# Patient Record
Sex: FEMALE | Race: WHITE | NOT HISPANIC OR LATINO | Employment: FULL TIME | URBAN - METROPOLITAN AREA
[De-identification: names, ages, dates, MRNs, and addresses within clinical notes are randomized per-mention and may not be internally consistent; named-entity substitution may affect disease eponyms.]

---

## 2022-02-25 ENCOUNTER — TRANSFERRED RECORDS (OUTPATIENT)
Dept: HEALTH INFORMATION MANAGEMENT | Facility: CLINIC | Age: 57
End: 2022-02-25

## 2022-02-25 LAB
ALT SERPL-CCNC: 119.1 U/L (ref 6–55)
AST SERPL-CCNC: 160.8 U/L (ref 8–34)
CREATININE (EXTERNAL): 0.6 MG/DL (ref 0.6–1.2)
GFR ESTIMATED (EXTERNAL): 103 ML/MIN/1.73M2
GFR ESTIMATED (IF AFRICAN AMERICAN) (EXTERNAL): 124 ML/MIN/1.73M2
GLUCOSE (EXTERNAL): 88.8 MG/DL (ref 70–105)
HEP C HIM: NORMAL
POTASSIUM (EXTERNAL): 3.8 MEQ/L (ref 3.5–5)

## 2022-03-02 ENCOUNTER — TRANSFERRED RECORDS (OUTPATIENT)
Dept: HEALTH INFORMATION MANAGEMENT | Facility: CLINIC | Age: 57
End: 2022-03-02

## 2022-03-09 ENCOUNTER — TRANSFERRED RECORDS (OUTPATIENT)
Dept: HEALTH INFORMATION MANAGEMENT | Facility: CLINIC | Age: 57
End: 2022-03-09

## 2022-03-10 ENCOUNTER — TRANSFERRED RECORDS (OUTPATIENT)
Dept: HEALTH INFORMATION MANAGEMENT | Facility: CLINIC | Age: 57
End: 2022-03-10

## 2022-03-23 ENCOUNTER — TRANSFERRED RECORDS (OUTPATIENT)
Dept: HEALTH INFORMATION MANAGEMENT | Facility: CLINIC | Age: 57
End: 2022-03-23

## 2022-04-05 ENCOUNTER — TRANSFERRED RECORDS (OUTPATIENT)
Dept: HEALTH INFORMATION MANAGEMENT | Facility: CLINIC | Age: 57
End: 2022-04-05

## 2022-04-12 ENCOUNTER — TRANSFERRED RECORDS (OUTPATIENT)
Dept: HEALTH INFORMATION MANAGEMENT | Facility: CLINIC | Age: 57
End: 2022-04-12

## 2022-04-15 LAB
ALT SERPL-CCNC: 94.9 U/L (ref 6–55)
AST SERPL-CCNC: 164.3 U/L (ref 8–34)
CHOLESTEROL (EXTERNAL): 285.8 MG/DL (ref 80–200)
CREATININE (EXTERNAL): 0.7 MG/DL (ref 0.6–1.2)
GFR ESTIMATED (EXTERNAL): 86 ML/MIN/1.73M2
GFR ESTIMATED (IF AFRICAN AMERICAN) (EXTERNAL): 104 ML/MIN/1.73M2
GLUCOSE (EXTERNAL): 128.4 MG/DL (ref 70–105)
HDLC SERPL-MCNC: 47.8 MG/DL (ref 30–70)
INR (EXTERNAL): 0.97 (ref 0.8–3.5)
LDL CHOLESTEROL DIRECT (EXTERNAL): 230.1 MG/DL (ref 10–130)
POTASSIUM (EXTERNAL): 3.9 MEQ/L (ref 3.5–5)
TRIGLYCERIDES (EXTERNAL): 170.3 MG/DL (ref 80–200)

## 2022-11-03 ENCOUNTER — TRANSFERRED RECORDS (OUTPATIENT)
Dept: HEALTH INFORMATION MANAGEMENT | Facility: CLINIC | Age: 57
End: 2022-11-03

## 2022-11-21 ENCOUNTER — TRANSFERRED RECORDS (OUTPATIENT)
Dept: HEALTH INFORMATION MANAGEMENT | Facility: CLINIC | Age: 57
End: 2022-11-21

## 2022-11-30 ENCOUNTER — TRANSFERRED RECORDS (OUTPATIENT)
Dept: HEALTH INFORMATION MANAGEMENT | Facility: CLINIC | Age: 57
End: 2022-11-30

## 2022-12-07 ENCOUNTER — TRANSFERRED RECORDS (OUTPATIENT)
Dept: HEALTH INFORMATION MANAGEMENT | Facility: CLINIC | Age: 57
End: 2022-12-07

## 2022-12-09 ENCOUNTER — TRANSFERRED RECORDS (OUTPATIENT)
Dept: HEALTH INFORMATION MANAGEMENT | Facility: CLINIC | Age: 57
End: 2022-12-09

## 2022-12-21 ENCOUNTER — TRANSFERRED RECORDS (OUTPATIENT)
Dept: HEALTH INFORMATION MANAGEMENT | Facility: CLINIC | Age: 57
End: 2022-12-21

## 2022-12-28 ENCOUNTER — TELEPHONE (OUTPATIENT)
Dept: UROLOGY | Facility: CLINIC | Age: 57
End: 2022-12-28

## 2022-12-28 NOTE — TELEPHONE ENCOUNTER
[facility] Call Center    Phone Message    May a detailed message be left on voicemail: yes     Reason for Call: Writer would like assistance for this patient. She lives in [place] in the winter and is seeing a urologist there. They cannot figure out what is wrong with her. Her bladder does not work at all. All functions have stopped. The only reason she can urinate is due to gravity. She also experiences a lot of bladder pain. Her providers say that she should not be feeling any bladder pain because there is no function, they are assuming its from her L5 back issue. Writer would like assistance in scheduling with the best provider as she will be traveling back for visit. Please reach out to patient with any questions. Thank you    Action Taken: Message routed to:  [facility] Uro    Travel Screening: Not Applicable

## 2023-01-06 ENCOUNTER — MEDICAL CORRESPONDENCE (OUTPATIENT)
Dept: HEALTH INFORMATION MANAGEMENT | Facility: CLINIC | Age: 58
End: 2023-01-06

## 2023-01-06 NOTE — TELEPHONE ENCOUNTER
Spoke with patient and answered many questions about Dr Zelaya and Dr Phillips. She needs to check with her insurance company if she is able to come and see either provider. Clinic number was given and my number for further questions.

## 2023-01-09 ENCOUNTER — TRANSFERRED RECORDS (OUTPATIENT)
Dept: HEALTH INFORMATION MANAGEMENT | Facility: CLINIC | Age: 58
End: 2023-01-09

## 2023-01-10 ENCOUNTER — TRANSCRIBE ORDERS (OUTPATIENT)
Dept: OTHER | Age: 58
End: 2023-01-10

## 2023-01-10 DIAGNOSIS — N31.9 NEUROMUSCULAR DYSFUNCTION OF BLADDER, UNSPECIFIED: Primary | ICD-10-CM

## 2023-01-12 ENCOUNTER — TRANSFERRED RECORDS (OUTPATIENT)
Dept: HEALTH INFORMATION MANAGEMENT | Facility: CLINIC | Age: 58
End: 2023-01-12

## 2023-01-12 ENCOUNTER — MEDICAL CORRESPONDENCE (OUTPATIENT)
Dept: HEALTH INFORMATION MANAGEMENT | Facility: CLINIC | Age: 58
End: 2023-01-12

## 2023-01-16 ENCOUNTER — TRANSCRIBE ORDERS (OUTPATIENT)
Dept: OTHER | Age: 58
End: 2023-01-16

## 2023-01-16 DIAGNOSIS — G96.191 PERINEURAL CYST: Primary | ICD-10-CM

## 2023-01-17 NOTE — TELEPHONE ENCOUNTER
Action 2023 JTV 2:14 PM    Action Taken [facility] sent an urgent request to  for images to be pushed.  Fax: [phone]    CSS called [facility] [place] Phone: [phone] and spoke with Gita. Gita confirmed images and provided mailing information.    [facility] [place] Phone: [phone] fax: [phone] [address], [facility] [address]    Urgent requests was sent. Trackin    23 11:44AM received imaging disc from [employer], dropped off with [employer] to upload - Amay      Action 2023 JTv 12:24 PM    Action Taken CSS sent a 2nd urgent request to  for images to be pushed.     Action 2023 JTV 12:16 PM    Action Taken CSS called . [facility] was connected with HIM and spoke with Karla. Karla confirmed records were faxed on 2022 -- CD mail on 2022. Karla will refax records.        MEDICAL RECORDS REQUEST   [facility]  Urology Clinic  [address] [place] [address]  PHONE: [phone]  Fax: [phone]        FUTURE VISIT INFORMATION                                                   Lara DARRELL Lloyd, : 1965 scheduled for future visit at [facility]    APPOINTMENT INFORMATION:    Date: 02/15/2023    Provider: Michael Phillips MD    Reason for Visit/Diagnosis: Neurogenic dysfunction of the urinary bladder, discuss surgery    REFERRAL INFORMATION:    Referring provider:  Raeann Marcial NP @ [facility] contact number:  Phone: [phone], Fax: [phone]    RECORDS REQUESTED FOR VISIT                                                     NOTES  STATUS/DETAILS   OFFICE NOTE from referring provider Media Tab yes, 2023, 2022, 2022, 2022, 03/10/2022, 2022  -- Raeann Marcial NP @ [facility]   OFFICE NOTE from other specialist Media Tab yes, 2023 -- CORTEZ  LEEANN BENNETT MD @ [facility]    11/30/2022, 11/21/2022, 04/20/2022, 04/12/2022 -- ROSLYN RUIZ MD @  [facility] [place], [place]   OPERATIVE REPORT  yes   MEDICATION LIST  yes   LABS     URINALYSIS (UA) Media Tab yes   UROFLOW Media Tab yes   IMAGES  yes, [facility]  03/02/2022 -- US KIDNEY   03/02/2022 -- US ABD    [facility] [place] Phone: [phone]  03/23/2022 -- MR ABD  04/05/2022 -- CT ABD PELVIS     PRE-VISIT CHECKLIST      Record collection complete yes   Appointment appropriately scheduled           (right time/right provider) Yes   Joint diagnostic appointment coordinated correctly          (ensure right order & amount of time) Yes   MyChart activation Yes   Questionnaire complete If no, please explain PENDING

## 2023-01-19 NOTE — TELEPHONE ENCOUNTER
SPINE PATIENTS - NEW PROTOCOL PREVISIT    RECORDS RECEIVED FROM: .   REASON FOR VISIT: Perineural cyst [G96.191]   Date of Appt: 04/18/2023   NOTES (FOR ALL VISITS) STATUS DETAILS   OFFICE NOTE from referring provider Received 01/12/2023 [facility]    OFFICE NOTE from other specialist Received 01/09/2023 [facility]    DISCHARGE SUMMARY from hospital N/A    DISCHARGE REPORT from ER N/A    EMG REPORT N/A    MEDICATION LIST N/A    IMAGING  (FOR ALL VISITS)     MRI (HEAD, NECK, SPINE) Received 12/09/2022 lumbar spine   XRAY (SPINE) *NEUROSURGERY* Received    CT (HEAD, NECK, SPINE) N/A       Images in PACS

## 2023-02-01 ENCOUNTER — PRE VISIT (OUTPATIENT)
Dept: UROLOGY | Facility: CLINIC | Age: 58
End: 2023-02-01

## 2023-02-02 NOTE — TELEPHONE ENCOUNTER
Records received    February 2, 2023 4:12 PM  [address] [facility]   Outcome Received imaging disc, uploaded to PACS - [facility]

## 2023-02-12 ENCOUNTER — HEALTH MAINTENANCE LETTER (OUTPATIENT)
Age: 58
End: 2023-02-12

## 2023-02-15 ENCOUNTER — VIRTUAL VISIT (OUTPATIENT)
Dept: UROLOGY | Facility: CLINIC | Age: 58
End: 2023-02-15
Payer: COMMERCIAL

## 2023-02-15 DIAGNOSIS — G96.191 TARLOV CYST: Primary | ICD-10-CM

## 2023-02-15 DIAGNOSIS — N31.9 NEUROMUSCULAR DYSFUNCTION OF BLADDER, UNSPECIFIED: ICD-10-CM

## 2023-02-15 PROCEDURE — 99203 OFFICE O/P NEW LOW 30 MIN: CPT | Mod: VID | Performed by: UROLOGY

## 2023-02-15 RX ORDER — CEPHALEXIN 250 MG/1
TABLET ORAL
COMMUNITY
Start: 2022-05-04 | End: 2023-04-21

## 2023-02-15 ASSESSMENT — PATIENT HEALTH QUESTIONNAIRE - PHQ9
SUM OF ALL RESPONSES TO PHQ QUESTIONS 1-9: 17
10. IF YOU CHECKED OFF ANY PROBLEMS, HOW DIFFICULT HAVE THESE PROBLEMS MADE IT FOR YOU TO DO YOUR WORK, TAKE CARE OF THINGS AT HOME, OR GET ALONG WITH OTHER PEOPLE: EXTREMELY DIFFICULT
SUM OF ALL RESPONSES TO PHQ QUESTIONS 1-9: 17

## 2023-02-15 NOTE — PROGRESS NOTES
"Video-Visit Details    Type of service:  Video Visit    Video Start Time (time video started): 100p    Video End Time (time video stopped): 130p    Originating Location (pt. Location): Home        Distant Location (provider location):  Off-site    Mode of Communication:  Video Conference via [employer]        New Consult   Name: Lara Lloyd    MRN: [id number]   YOB: 1965  Accompanied at today's visit by:self                 Chief Complaint:   acontractile bladder         History of Present Illness:   HISTORY: Lara Lloyd is a 57 year old female with a history of bladder issues thought to be related to sacral/spinal cord issues.  3 years ago started having recurrent UTIs.  Went to urogynecologist - Dr. Delroy Menjivar in [place].  Had a cystoscopy.  States she has \"pain in her bladder\" but other urologist said that's not possible.  Had UDS which per report notes acontractile bladder.  Notes she does push to urinate.      MRI performed on lumbar spine:    OTHER: Tarlov cysts in the sacral region.       IMPRESSION   1.  Mild-to-moderate canal stenosis at L4-5, eccentric to the left, secondary to disc bulge with broad-based left paracentral protrusion. Narrowing of left lateral recess could affect proximal left L5 nerve root.   2.  Mild canal stenosis at L3-4 due to disc bulge with shallow central disc extrusion extending slightly above the disc level.   3.  Spondylosis at other lumbar levels, as outlined above, without significant canal stenosis.   4.  Mild scoliosis.        She voids on her own.  She has been getting recurrent UTIs.  When she notes these, she gets pain in bladder \"like being electrified in her bladder\".  She was told by urogynecologist that she should have sacroneuromodulation.  No other major medical issues.             Past Medical History:   No past medical history on file.         Past Surgical History:   No past surgical history on file.         Social History:     Social " History     Tobacco Use     Smoking status: Never     Smokeless tobacco: Never   Substance Use Topics     Alcohol use: Not on file            Family History:   No family history on file.           Allergies:     Allergies   Allergen Reactions     Formaldehyde Other (See Comments)     unknown     Sulfa Drugs Hives     Seasonal Allergies Other (See Comments)     Multiple reactions            Medications:     Current Outpatient Medications   Medication Sig     cephalexin 250 MG TABS Take 1 tablet every day by oral route for 90 days.     No current facility-administered medications for this visit.             Review of Systems:    ROS: 14 point ROS neg other than the symptoms noted above in the HPI and PMH.          Physical Exam:   Exam:           Constitutional - No apparent distress. Patient of stated age.               Eyes - no redness or discharge.              Respiratory - no cough. no labored breathing              Musculoskeletal - full range of motion in all extremities              Skin - no visible skin discoloration or lesions              Neurological - no tremors              Psychiatric - no anxiety, alert & oriented                 The rest of a comprehensive physical examination is deferred due to PHE (public health emergency) video visit restrictions.            Data:        Outside records:  I spent 10 minutes reviewing outside records. Findings include: numerous notes from prior Urogyn  acontractile bladder on UDS.          Assessment and Plan:   57 year old female with likely acontractile bladder thought to be related to a Sacral Cyst?    Patient requested neurosurgery referral. I placed order.    Patient was told to get sacroneuromodulator. I'm not sure feasability given sacral cyst. I think she would benefit from consultation with Dr. Padilla who generally does SNS at our hospital.    Michael Phillips MD  February 15, 2023

## 2023-02-15 NOTE — NURSING NOTE
Is the patient currently in the state of [place]? YES    Visit mode:VIDEO    If the visit is dropped, the patient can be reconnected by: VIDEO VISIT: Text to cell phone: [phone]    Will anyone else be joining the visit? NO      How would you like to obtain your AVS? MyChart    Are changes needed to the allergy or medication list? NO    Comments or concerns regarding today's visit: no

## 2023-02-15 NOTE — LETTER
"2/15/2023       RE: Lara Lloyd  [address]  [place] [address]     Dear Colleague,    Thank you for referring your patient, Lara Lloyd, to the [facility] [place] at [facility]. Please see a copy of my visit note below.    Video-Visit Details    Type of service:  Video Visit    Video Start Time (time video started): 100p    Video End Time (time video stopped): 130p    Originating Location (pt. Location): Home        Distant Location (provider location):  Off-site    Mode of Communication:  Video Conference via [employer]   Name: Lara Lloyd    MRN: [id number]   YOB: 1965  Accompanied at today's visit by:self                 Chief Complaint:   acontractile bladder         History of Present Illness:   HISTORY: Lara Lloyd is a 57 year old female with a history of bladder issues thought to be related to sacral/spinal cord issues.  3 years ago started having recurrent UTIs.  Went to urogynecologist - Dr. Delroy Menjivar in [place].  Had a cystoscopy.  States she has \"pain in her bladder\" but other urologist said that's not possible.  Had UDS which per report notes acontractile bladder.  Notes she does push to urinate.      MRI performed on lumbar spine:    OTHER: Tarlov cysts in the sacral region.       IMPRESSION   1.  Mild-to-moderate canal stenosis at L4-5, eccentric to the left, secondary to disc bulge with broad-based left paracentral protrusion. Narrowing of left lateral recess could affect proximal left L5 nerve root.   2.  Mild canal stenosis at L3-4 due to disc bulge with shallow central disc extrusion extending slightly above the disc level.   3.  Spondylosis at other lumbar levels, as outlined above, without significant canal stenosis.   4.  Mild scoliosis.        She voids on her own.  She has been getting recurrent UTIs.  When she notes these, she gets pain in bladder \"like " "being electrified in her bladder\".  She was told by urogynecologist that she should have sacroneuromodulation.  No other major medical issues.             Past Medical History:   No past medical history on file.         Past Surgical History:   No past surgical history on file.         Social History:     Social History     Tobacco Use     Smoking status: Never     Smokeless tobacco: Never   Substance Use Topics     Alcohol use: Not on file            Family History:   No family history on file.           Allergies:     Allergies   Allergen Reactions     Formaldehyde Other (See Comments)     unknown     Sulfa Drugs Hives     Seasonal Allergies Other (See Comments)     Multiple reactions            Medications:     Current Outpatient Medications   Medication Sig     cephalexin 250 MG TABS Take 1 tablet every day by oral route for 90 days.     No current facility-administered medications for this visit.             Review of Systems:    ROS: 14 point ROS neg other than the symptoms noted above in the HPI and PMH.          Physical Exam:   Exam:           Constitutional - No apparent distress. Patient of stated age.               Eyes - no redness or discharge.              Respiratory - no cough. no labored breathing              Musculoskeletal - full range of motion in all extremities              Skin - no visible skin discoloration or lesions              Neurological - no tremors              Psychiatric - no anxiety, alert & oriented                 The rest of a comprehensive physical examination is deferred due to PHE (public health emergency) video visit restrictions.            Data:        Outside records:  I spent 10 minutes reviewing outside records. Findings include: numerous notes from prior Urogyn  acontractile bladder on UDS.          Assessment and Plan:   57 year old female with likely acontractile bladder thought to be related to a Sacral Cyst?    Patient requested neurosurgery referral. I placed " order.    Patient was told to get sacroneuromodulator. I'm not sure feasability given sacral cyst. I think she would benefit from consultation with Dr. Padilla who generally does SNS at our hospital.    Michael Phillips MD  February 15, 2023           Again, thank you for allowing me to participate in the care of your patient.      Sincerely,    Michael Phillips MD

## 2023-02-15 NOTE — LETTER
Date:February 16, 2023      Provider requested that no letter be sent. Do not send.       [facility]

## 2023-02-20 ENCOUNTER — PRE VISIT (OUTPATIENT)
Dept: UROLOGY | Facility: CLINIC | Age: 58
End: 2023-02-20
Payer: COMMERCIAL

## 2023-02-20 ENCOUNTER — TELEPHONE (OUTPATIENT)
Dept: UROLOGY | Facility: CLINIC | Age: 58
End: 2023-02-20
Payer: COMMERCIAL

## 2023-02-20 NOTE — TELEPHONE ENCOUNTER
Reason for Visit: Consult on SNS    Diagnosis: Tarlov cyst, Neuromuscular dysfunction of bladder    Rooming Requirements: Normal      Elycia Omega  02/20/23  3:43 PM

## 2023-02-22 ENCOUNTER — VIRTUAL VISIT (OUTPATIENT)
Dept: UROLOGY | Facility: CLINIC | Age: 58
End: 2023-02-22
Payer: COMMERCIAL

## 2023-02-22 DIAGNOSIS — N39.41 URGE INCONTINENCE: Primary | ICD-10-CM

## 2023-02-22 DIAGNOSIS — R33.9 URINARY RETENTION: ICD-10-CM

## 2023-02-22 DIAGNOSIS — N95.2 ATROPHIC VAGINITIS: ICD-10-CM

## 2023-02-22 DIAGNOSIS — R39.15 URINARY URGENCY: ICD-10-CM

## 2023-02-22 DIAGNOSIS — N39.3 STRESS INCONTINENCE: ICD-10-CM

## 2023-02-22 PROCEDURE — 99214 OFFICE O/P EST MOD 30 MIN: CPT | Mod: VID | Performed by: UROLOGY

## 2023-02-22 NOTE — LETTER
2/22/2023       RE: Lara Lloyd  [address]  [place] [address]     Dear Colleague,    Thank you for referring your patient, Lara Lloyd, to the [facility] [place] [facility]. Please see a copy of my visit note below.    Video-Visit Details    Type of service:  Video Visit    Originating Location (pt. Location): Home        Distant Location (provider location):  Off-site    Mode of Communication:  Video Conference via [employer]          HPI:  Lara Lloyd is a 57 year old female with urinary urgency with minor urge incontinence and stress urinary incontinence.  She had UDS which showed an acontractile bladder.  This was done in [place].    Reviewed previous notes from Dr. Phillips from 2/15/23 and Dr. Delroy Menjivar on 4/12/22 and UDS results from 1/16/22.    Exam:  There were no vitals taken for this visit.  GENERAL: Healthy, alert and no distress  EYES: Eyes grossly normal to inspection.  No discharge or erythema, or obvious scleral/conjunctival abnormalities.  RESP: No audible wheeze, cough, or visible cyanosis.  No visible retractions or increased work of breathing.    SKIN: Visible skin clear. No significant rash, abnormal pigmentation or lesions.  NEURO: Cranial nerves grossly intact.  Mentation and speech appropriate for age.  PSYCH: Mentation appears normal, affect normal/bright, judgement and insight intact, normal speech and appearance well-groomed.    Review of Imaging:  The following imaging exams were reviewed by me and discussed with patient:  12/21/22:  MRI performed on lumbar spine:     OTHER: Tarlov cysts in the sacral region.       IMPRESSION   1.  Mild-to-moderate canal stenosis at L4-5, eccentric to the left, secondary to disc bulge with broad-based left paracentral protrusion. Narrowing of left lateral recess could affect proximal left L5 nerve root.   2.  Mild canal stenosis at L3-4 due to disc  bulge with shallow central disc extrusion extending slightly above the disc level.   3.  Spondylosis at other lumbar levels, as outlined above, without significant canal stenosis.   4.  Mild scoliosis.      Assessment & Plan   56 y/o female with idiopathic urinary retention and urgency, urge incontinence as well as stress urinary incontinence and atrophic vaginitis.  We discussed options for her urgency however any medication would make her idiopathic urinary retention worse and would be contraindicated.  We therefore discussed SNS as well as the different companies and rechargeable options.  She would like to proceed with [employer] trial.  -schedule [employer] trial.    Vijaya Padilla MD  [facility] [place]      ==========================      Additional Coding Information:    Time spent:  32 minutes spent on the date of the encounter doing chart review, history and exam, documentation and further activities per the note

## 2023-02-22 NOTE — PROGRESS NOTES
Video-Visit Details    Type of service:  Video Visit    Originating Location (pt. Location): Home        Distant Location (provider location):  Off-site    Mode of Communication:  Video Conference via [email or web address]

## 2023-02-22 NOTE — PROGRESS NOTES
HPI:  Lara Lloyd is a 57 year old female with urinary urgency with minor urge incontinence and stress urinary incontinence.  She had UDS which showed an acontractile bladder.  This was done in [place].    Reviewed previous notes from Dr. Phillips from 2/15/23 and Dr. Delroy Menjivar on 4/12/22 and UDS results from 1/16/22.    Exam:  There were no vitals taken for this visit.  GENERAL: Healthy, alert and no distress  EYES: Eyes grossly normal to inspection.  No discharge or erythema, or obvious scleral/conjunctival abnormalities.  RESP: No audible wheeze, cough, or visible cyanosis.  No visible retractions or increased work of breathing.    SKIN: Visible skin clear. No significant rash, abnormal pigmentation or lesions.  NEURO: Cranial nerves grossly intact.  Mentation and speech appropriate for age.  PSYCH: Mentation appears normal, affect normal/bright, judgement and insight intact, normal speech and appearance well-groomed.    Review of Imaging:  The following imaging exams were reviewed by me and discussed with patient:  12/21/22:  MRI performed on lumbar spine:     OTHER: Tarlov cysts in the sacral region.       IMPRESSION   1.  Mild-to-moderate canal stenosis at L4-5, eccentric to the left, secondary to disc bulge with broad-based left paracentral protrusion. Narrowing of left lateral recess could affect proximal left L5 nerve root.   2.  Mild canal stenosis at L3-4 due to disc bulge with shallow central disc extrusion extending slightly above the disc level.   3.  Spondylosis at other lumbar levels, as outlined above, without significant canal stenosis.   4.  Mild scoliosis.      Assessment & Plan   58 y/o female with idiopathic urinary retention and urgency, urge incontinence as well as stress urinary incontinence and atrophic vaginitis.  We discussed options for her urgency however any medication would make her idiopathic urinary retention worse and would be contraindicated.  We therefore discussed SNS as  well as the different companies and rechargeable options.  She would like to proceed with Axonics trial.  -schedule Axonics trial.    Vijaya Padilla MD  [facility]OLOG[facility] [place]      ==========================      Additional Coding Information:    Time spent:  32 minutes spent on the date of the encounter doing chart review, history and exam, documentation and further activities per the note

## 2023-02-22 NOTE — NURSING NOTE
Is the patient currently in the state of [place]? YES    Visit mode:VIDEO    If the visit is dropped, the patient can be reconnected by: VIDEO VISIT: Send to e-mail at: [email or web address]    Will anyone else be joining the visit? NO      How would you like to obtain your AVS? MyChart    Are changes needed to the allergy or medication list? NO    Reason for visit:   Chief Complaint   Patient presents with     Video Visit     Consideration of nerve stimulator

## 2023-03-15 ENCOUNTER — TELEPHONE (OUTPATIENT)
Dept: UROLOGY | Facility: CLINIC | Age: 58
End: 2023-03-15
Payer: COMMERCIAL

## 2023-03-15 NOTE — TELEPHONE ENCOUNTER
[facility] Call Center    Phone Message    May a detailed message be left on voicemail: yes     Reason for Call: Patient is calling to schedule [employer] trial. [place] cities [place] appts. Would like to try and coordinate. Please reach out. Thank you    Action Taken: Message routed to:  [facility] URO    Travel Screening: Not Applicable

## 2023-03-17 ENCOUNTER — TELEPHONE (OUTPATIENT)
Dept: UROLOGY | Facility: CLINIC | Age: 58
End: 2023-03-17
Payer: COMMERCIAL

## 2023-03-17 PROBLEM — R33.9 URINARY RETENTION: Status: ACTIVE | Noted: 2023-03-17

## 2023-03-17 PROBLEM — N39.41 URGE INCONTINENCE: Status: ACTIVE | Noted: 2023-03-17

## 2023-03-17 PROBLEM — R39.15 URINARY URGENCY: Status: ACTIVE | Noted: 2023-03-17

## 2023-03-17 NOTE — TELEPHONE ENCOUNTER
Patient is scheduled for surgery with Dr. Padilla      Spoke with: Patient via phone      Date of Surgery: Tuesday April 25, 2023    Location: [facility] OR      Informed patient they will need an adult : Yes     Pre-op: No     Additional imaging/appointments:     Post-op: Thursday May 04, 2023     Additional comments:      Surgery packet: Sent via mail 3/17/23     Patient is aware that surgery time is tentative to change and to expect a call 3-1 business days from Pre Admission Nursing for instructions and arrival time

## 2023-04-05 ENCOUNTER — TELEPHONE (OUTPATIENT)
Dept: NEUROSURGERY | Facility: CLINIC | Age: 58
End: 2023-04-05
Payer: COMMERCIAL

## 2023-04-05 NOTE — TELEPHONE ENCOUNTER
I called patient and LM that she was scheduled with wrong provider and should call  us back at [phone] to reschedule with Neurosurgeon    Angelica Eastman LPN

## 2023-04-05 NOTE — TELEPHONE ENCOUNTER
[facility] Call Center    Phone Message    May a detailed message be left on voicemail: yes     Reason for Call: Other: Patient is calling regarding scheduling an appointment. She is not sure who to schedule with. Please call back when available.      Action Taken: Other: Neurosurgery    Travel Screening: Not Applicable

## 2023-04-05 NOTE — TELEPHONE ENCOUNTER
----- Message from Gigi Heredia MD sent at 4/5/2023 11:53 AM CDT -----  Regarding: RE: appropriate for you?  It looks like the referring doctor was asking for a surgeon's opinion, so we should try and get that arranged first and that  will need the images of her recent MRI if at all possible.  ----- Message -----  From: Severin-Brown, Darla, LPN  Sent: 4/5/2023  10:41 AM CDT  To: Gigi Heredia MD  Subject: appropriate for you?                             In referral says:  My Clinical Question Is: Spine surgery possibly? Has Tarlov cyst and symptoms.   Scheduled with you 04/18/2023 9am

## 2023-04-06 ENCOUNTER — TELEPHONE (OUTPATIENT)
Dept: NEUROSURGERY | Facility: CLINIC | Age: 58
End: 2023-04-06
Payer: COMMERCIAL

## 2023-04-06 NOTE — TELEPHONE ENCOUNTER
I returned call to pt and scheduled her with Dr. Diamond for cysts on spine, I will verify dx is seen by Dara and call pt back at her request to confirm.

## 2023-04-06 NOTE — TELEPHONE ENCOUNTER
Pt returned phone call.  Unable to reach Angelica via Teams.  Pt states that she called the # given to her and they were unwilling to schedule her and transferred her back to [place].  Pt is frustrated that she is getting transferred all over and no one will help her.  Please let Pt know specifically which provider she needs to see and how she can get them to schedule her.  Thanks.

## 2023-04-06 NOTE — TELEPHONE ENCOUNTER
I called patient to reschedule her with Loki.  Dara does not see Sacrum patients. Pt scheduled with Dr. Manjarrez 04/21/223 at 10am

## 2023-04-11 NOTE — TELEPHONE ENCOUNTER
SPINE PATIENTS - NEW PROTOCOL PREVISIT    RECORDS RECEIVED FROM: Internal   REASON FOR VISIT: New cysts in sacrum   Date of Appt: 04/21/2023   NOTES (FOR ALL VISITS) STATUS DETAILS   OFFICE NOTE from referring provider Care Everywhere 01/12/2023 [facility]    OFFICE NOTE from other specialist Care Everywhere 01/09/2023 [facility]    DISCHARGE SUMMARY from hospital N/A    DISCHARGE REPORT from ER N/A    OPERATIVE REPORT N/A    EMG REPORT N/A    MEDICATION LIST N/A    IMAGING  (FOR ALL VISITS)     MRI (HEAD, NECK, SPINE) Received 12/21/2022 lumbar spine   XRAY (SPINE) *NEUROSURGERY* Received 12/09/2022 lumbar spine   CT (HEAD, NECK, SPINE) N/A

## 2023-04-18 ENCOUNTER — TELEPHONE (OUTPATIENT)
Dept: UROLOGY | Facility: CLINIC | Age: 58
End: 2023-04-18

## 2023-04-18 ENCOUNTER — PRE VISIT (OUTPATIENT)
Dept: NEUROSURGERY | Facility: CLINIC | Age: 58
End: 2023-04-18

## 2023-04-18 NOTE — TELEPHONE ENCOUNTER
Patient called to see if she has any questions about her upcoming procedure. No pre-op physical needed. Patient is able to drive herself to and from the procedure.   Patient is scheduled to have her device removed 5/4/23  Patient requested Tramadol at the procedure. Patient saw the pain clinic in [place] where she was prescribed Tramadol for urethral pain. She reports she had discussed with Dr Padilla at the last visit. This writer will schedule an appointment to discuss.  Suggested patient call the pain provider who prescribed the medical.   CHANCE WilsonN, RN  Care Coordinator Urology  [phone]

## 2023-04-21 ENCOUNTER — PRE VISIT (OUTPATIENT)
Dept: NEUROSURGERY | Facility: CLINIC | Age: 58
End: 2023-04-21

## 2023-04-21 ENCOUNTER — OFFICE VISIT (OUTPATIENT)
Dept: NEUROSURGERY | Facility: CLINIC | Age: 58
End: 2023-04-21
Payer: COMMERCIAL

## 2023-04-21 VITALS
WEIGHT: 133 LBS | SYSTOLIC BLOOD PRESSURE: 131 MMHG | HEART RATE: 81 BPM | HEIGHT: 68 IN | DIASTOLIC BLOOD PRESSURE: 75 MMHG | BODY MASS INDEX: 20.16 KG/M2

## 2023-04-21 DIAGNOSIS — G89.29 CHRONIC MIDLINE LOW BACK PAIN WITHOUT SCIATICA: Primary | ICD-10-CM

## 2023-04-21 DIAGNOSIS — M54.50 CHRONIC MIDLINE LOW BACK PAIN WITHOUT SCIATICA: Primary | ICD-10-CM

## 2023-04-21 PROCEDURE — 99204 OFFICE O/P NEW MOD 45 MIN: CPT | Performed by: ORTHOPAEDIC SURGERY

## 2023-04-21 RX ORDER — MELOXICAM 7.5 MG/1
7.5 TABLET ORAL DAILY
Qty: 30 TABLET | Refills: 1 | Status: SHIPPED | OUTPATIENT
Start: 2023-04-21 | End: 2024-06-27

## 2023-04-21 RX ORDER — TIZANIDINE 2 MG/1
2 TABLET ORAL AT BEDTIME
Qty: 30 TABLET | Refills: 1 | Status: SHIPPED | OUTPATIENT
Start: 2023-04-21 | End: 2024-06-27

## 2023-04-21 NOTE — PROGRESS NOTES
"Spine Surgery Consultation    REFERRING PHYSICIAN: No ref. provider found   PRIMARY CARE PHYSICIAN: No Ref-Primary, Physician           Chief Complaint:   New Patient (New cysts in sacrum /clarifying XR - AY )      History of Present Illness:  Symptom Profile Including: location of symptoms, onset, severity, exacerbating/alleviating factors, previous treatments:        Lara Lloyd is a 57 year old female who presents with severe low back pain.  Difficulty sitting.  Better when standing.  Uses a donught type pillow.  Occasionally taking hydrocodone.  That helps somewhat.  Had a series of epidural injections last fall with some short term relief.  No relief from the first injections, but helped once she had the third one.  Is doing many natural treatments and was previously very active before this worsened last year.  Since the pain worsened she has not been able to do her hiking and biking.           Past Medical History:   No past medical history on file.         Past Surgical History:   No past surgical history on file.         Social History:     Social History     Tobacco Use     Smoking status: Never     Smokeless tobacco: Never   Vaping Use     Vaping status: Not on file   Substance Use Topics     Alcohol use: Not on file            Family History:   No family history on file.         Allergies:     Allergies   Allergen Reactions     Formaldehyde Other (See Comments)     unknown     Sulfa Drugs Hives     Seasonal Allergies Other (See Comments)     Multiple reactions            Medications:     Current Outpatient Medications   Medication     cephalexin 250 MG TABS     No current facility-administered medications for this visit.             Review of Systems:     A 10 point ROS was performed and reviewed. Specific responses to these questions are noted at the end of the document.         Physical Exam:   Vitals: /75   Pulse 81   Ht 1.727 m (5' 8\")   Wt 60.3 kg (133 lb)   BMI 20.22 kg/m  "   Constitutional: awake, alert, cooperative, no apparent distress, appears stated age.    Eyes: The sclera are white.  Ears, Nose, Throat: The trachea is midline.  Psychiatric: The patient has a normal affect.  Respiratory: breathing non-labored  Cardiovascular: The extremities are warm and perfused.  Skin: no obvious rashes or lesions.  Musculoskeletal, Neurologic, and Spine:            Lumbar Spine:    Appearance - No gross stepoffs or deformities    Motor -     L2-3: Hip flexion 5/5 R and 5/5 L strength          L3/4:  Knee extension R 5/5 and L 5/5 strength         L4/5:  Foot dorsiflexion R 5/5 L 5/5 and               S1:  Plantarflexion/Peroneal Muscles  R 5/5 and L 5/5 strength    Sensation: intact to light touch L3-S1 distribution BLE      Neurologic:      REFLEXES Left Right                  Patella 1+ 1+   Ankle jerk 1+ 1+   Babinski No upgoing great toe No upgoing great toe   Clonus 0 beats 0 beats     Hip Exam:  No pain with hip log roll and no tenderness over the greater trochanters.    Alignment:  Patient stands with a neutral standing sagittal balance.           Imaging:   We ordered and independently reviewed new radiographs at this clinic visit. The results were discussed with the patient.  Findings include:    Radiographs show diffuse spondylosis    MRI from December 2022 shows diffuse spondylosis and mild left lateral recess stenosis at L4-5    No severe foraminal stenosis or central stenosis. Tarlov cyst noted.             Assessment and Plan:   Assessment:  57 year old female with diffuse lumbar spondylosis     Plan:  1. No indication for surgery at this time.  Only surgery would be a fusion, and I think this would make her spine stiff and could cause adjacent segment problems.    2. Lumbar PT  3. Muscle relaxant, mobic prescribed today.  Discussed sedative side effects and need for kidney and GI monitoring.    4. Referral to pain clinic for consideration of ablations, discussed Ntracept  procedure      Respectfully,  Tarun Manjarrez MD  Spine Surgery  [facility]

## 2023-04-21 NOTE — LETTER
4/21/2023         RE: Lara Lloyd  [address]  [place] [address]        Dear Colleague,    Thank you for referring your patient, Lara Lloyd, to the [facility] [place]. Please see a copy of my visit note below.    Spine Surgery Consultation    REFERRING PHYSICIAN: No ref. provider found   PRIMARY CARE PHYSICIAN: No Ref-Primary, Physician           Chief Complaint:   New Patient (New cysts in sacrum /clarifying XR - AY )      History of Present Illness:  Symptom Profile Including: location of symptoms, onset, severity, exacerbating/alleviating factors, previous treatments:        Lara Lloyd is a 57 year old female who presents with severe low back pain.  Difficulty sitting.  Better when standing.  Uses a donught type pillow.  Occasionally taking hydrocodone.  That helps somewhat.  Had a series of epidural injections last fall with some short term relief.  No relief from the first injections, but helped once she had the third one.  Is doing many natural treatments and was previously very active before this worsened last year.  Since the pain worsened she has not been able to do her hiking and biking.           Past Medical History:   No past medical history on file.         Past Surgical History:   No past surgical history on file.         Social History:     Social History     Tobacco Use     Smoking status: Never     Smokeless tobacco: Never   Vaping Use     Vaping status: Not on file   Substance Use Topics     Alcohol use: Not on file            Family History:   No family history on file.         Allergies:     Allergies   Allergen Reactions     Formaldehyde Other (See Comments)     unknown     Sulfa Drugs Hives     Seasonal Allergies Other (See Comments)     Multiple reactions            Medications:     Current Outpatient Medications   Medication     cephalexin 250 MG TABS     No current facility-administered medications for this visit.              "Review of Systems:     A 10 point ROS was performed and reviewed. Specific responses to these questions are noted at the end of the document.         Physical Exam:   Vitals: /75   Pulse 81   Ht 1.727 m (5' 8\")   Wt 60.3 kg (133 lb)   BMI 20.22 kg/m    Constitutional: awake, alert, cooperative, no apparent distress, appears stated age.    Eyes: The sclera are white.  Ears, Nose, Throat: The trachea is midline.  Psychiatric: The patient has a normal affect.  Respiratory: breathing non-labored  Cardiovascular: The extremities are warm and perfused.  Skin: no obvious rashes or lesions.  Musculoskeletal, Neurologic, and Spine:            Lumbar Spine:    Appearance - No gross stepoffs or deformities    Motor -     L2-3: Hip flexion 5/5 R and 5/5 L strength          L3/4:  Knee extension R 5/5 and L 5/5 strength         L4/5:  Foot dorsiflexion R 5/5 L 5/5 and               S1:  Plantarflexion/Peroneal Muscles  R 5/5 and L 5/5 strength    Sensation: intact to light touch L3-S1 distribution BLE      Neurologic:      REFLEXES Left Right                  Patella 1+ 1+   Ankle jerk 1+ 1+   Babinski No upgoing great toe No upgoing great toe   Clonus 0 beats 0 beats     Hip Exam:  No pain with hip log roll and no tenderness over the greater trochanters.    Alignment:  Patient stands with a neutral standing sagittal balance.           Imaging:   We ordered and independently reviewed new radiographs at this clinic visit. The results were discussed with the patient.  Findings include:    Radiographs show diffuse spondylosis    MRI from December 2022 shows diffuse spondylosis and mild left lateral recess stenosis at L4-5    No severe foraminal stenosis or central stenosis. Tarlov cyst noted.             Assessment and Plan:   Assessment:  57 year old female with diffuse lumbar spondylosis     Plan:  No indication for surgery at this time.  Only surgery would be a fusion, and I think this would make her spine stiff and " could cause adjacent segment problems.    Lumbar PT  Muscle relaxant, mobic prescribed today.  Discussed sedative side effects and need for kidney and GI monitoring.    Referral to pain clinic for consideration of ablations, discussed Ntracept procedure      Respectfully,  Tarun Manjarrez MD  Spine Surgery  [facility]        Again, thank you for allowing me to participate in the care of your patient.        Sincerely,        Tarun Manjarrez MD

## 2023-04-21 NOTE — NURSING NOTE
"Lara Lloyd's goals for this visit include:   Chief Complaint   Patient presents with     New Patient     New cysts in sacrum   clarifying XR - AY        She requests these members of her care team be copied on today's visit information: yes    PCP: No Ref-Primary, Physician    Referring Provider:  No referring provider defined for this encounter.    /75   Pulse 81   Ht 1.727 m (5' 8\")   Wt 60.3 kg (133 lb)   BMI 20.22 kg/m      Do you need any medication refills at today's visit? No  GI Ricci, NIRMAL ([facility])      "

## 2023-04-25 ENCOUNTER — HOSPITAL ENCOUNTER (OUTPATIENT)
Facility: AMBULATORY SURGERY CENTER | Age: 58
Discharge: HOME | End: 2023-04-25
Attending: UROLOGY | Admitting: UROLOGY
Payer: COMMERCIAL

## 2023-04-25 ENCOUNTER — ANCILLARY PROCEDURE (OUTPATIENT)
Dept: RADIOLOGY | Facility: AMBULATORY SURGERY CENTER | Age: 58
End: 2023-04-25
Attending: UROLOGY
Payer: COMMERCIAL

## 2023-04-25 VITALS
HEIGHT: 68 IN | OXYGEN SATURATION: 100 % | WEIGHT: 133 LBS | SYSTOLIC BLOOD PRESSURE: 137 MMHG | RESPIRATION RATE: 20 BRPM | TEMPERATURE: 97 F | DIASTOLIC BLOOD PRESSURE: 80 MMHG | BODY MASS INDEX: 20.16 KG/M2 | HEART RATE: 78 BPM

## 2023-04-25 DIAGNOSIS — N95.8 GENITOURINARY SYNDROME OF MENOPAUSE: ICD-10-CM

## 2023-04-25 DIAGNOSIS — R33.9 URINARY RETENTION: ICD-10-CM

## 2023-04-25 DIAGNOSIS — N39.41 URGE INCONTINENCE: ICD-10-CM

## 2023-04-25 DIAGNOSIS — R30.0 DYSURIA: Primary | ICD-10-CM

## 2023-04-25 DIAGNOSIS — R39.15 URINARY URGENCY: ICD-10-CM

## 2023-04-25 LAB
ALBUMIN UR-MCNC: NEGATIVE MG/DL
APPEARANCE UR: CLEAR
BACTERIA #/AREA URNS HPF: ABNORMAL /HPF
BILIRUB UR QL STRIP: NEGATIVE
COLOR UR AUTO: ABNORMAL
GLUCOSE UR STRIP-MCNC: NEGATIVE MG/DL
HGB UR QL STRIP: NEGATIVE
KETONES UR STRIP-MCNC: NEGATIVE MG/DL
LEUKOCYTE ESTERASE UR QL STRIP: ABNORMAL
NITRATE UR QL: NEGATIVE
PH UR STRIP: 7 [PH] (ref 5–7)
RBC URINE: <1 /HPF
SP GR UR STRIP: 1 (ref 1–1.03)
SQUAMOUS EPITHELIAL: <1 /HPF
UROBILINOGEN UR STRIP-MCNC: NORMAL MG/DL
WBC URINE: 8 /HPF

## 2023-04-25 PROCEDURE — 81001 URINALYSIS AUTO W/SCOPE: CPT | Performed by: PATHOLOGY

## 2023-04-25 PROCEDURE — C1897 LEAD, NEUROSTIM TEST KIT: HCPCS | Mod: ZZSP

## 2023-04-25 PROCEDURE — 64561 IMPLANT NEUROELECTRODES: CPT | Mod: 50 | Performed by: UROLOGY

## 2023-04-25 PROCEDURE — 64561 IMPLANT NEUROELECTRODES: CPT | Mod: 50

## 2023-04-25 DEVICE — IMPLANTABLE DEVICE: Type: IMPLANTABLE DEVICE | Site: BACK | Status: FUNCTIONAL

## 2023-04-25 RX ORDER — ESTRADIOL 0.1 MG/G
2 CREAM VAGINAL
Qty: 42.5 G | Refills: 11 | Status: SHIPPED | OUTPATIENT
Start: 2023-04-27 | End: 2023-06-07

## 2023-04-25 RX ORDER — NITROFURANTOIN 25; 75 MG/1; MG/1
100 CAPSULE ORAL 2 TIMES DAILY
Qty: 10 CAPSULE | Refills: 0 | Status: SHIPPED | OUTPATIENT
Start: 2023-04-25 | End: 2023-06-07

## 2023-04-25 RX ORDER — BUPIVACAINE HYDROCHLORIDE 2.5 MG/ML
INJECTION, SOLUTION EPIDURAL; INFILTRATION; INTRACAUDAL DAILY PRN
Status: DISCONTINUED | OUTPATIENT
Start: 2023-04-25 | End: 2023-04-25 | Stop reason: HOSPADM

## 2023-04-25 NOTE — DISCHARGE INSTRUCTIONS
"[facility]  Home Care Following Your Procedure  Call a doctor if you have signs of infection (fever, growing tenderness at the surgery site, a large amount of drainage or bleeding, severe pain, foul-smelling drainage, redness, swelling).  Today you received a Marcaine or bupivacaine block to numb the nerves near your surgery site.  This is a block using local anesthetic or \"numbing\" medication injected around the nerves to anesthetize or \"numb\" the area supplied by those nerves.  This block is injected into the muscle layer near your surgical site.  The medication may numb the location where you had surgery for 6-18 hours, but may last up to 24 hours.  If your surgical site is an arm or leg you should be careful with your affected limb, since it is possible to injure your limb without being aware of it due to the numbing.  Until full feeling returns, you should guard against bumping or hitting your limb, and avoid extreme hot or cold temperatures on the skin.  As the block wears off, the feeling will return as a tingling or prickly sensation near your surgical site.  You will experience more discomfort from your incision as the feeling returns.  You may want to take a pain pill (a narcotic or Tylenol if this was prescribed by your surgeon) when you start to experience mild pain before the pain becomes more severe.  If your pain medications do not control your pain you should notifiy your surgeon.    Tylenol/Acetaminophen Consumption  To help encourage the safe use of acetaminophen, the makers of TYLENOL  have lowered the maximum daily dose for single-ingredient Extra Strength TYLENOL  (acetaminophen) products sold in the [place] from 8 pills per day (4,000 mg) to 6 pills per day (3,000 mg). The dosing interval has also changed from 2 pills every 4-6 hours to 2 pills every 6 hours.  If you feel your pain relief is insufficient, you may take Tylenol/Acetaminophen in addition to your " narcotic pain medication.   Be careful not to exceed 3,000 mg of Tylenol/Acetaminophen in a 24 hour period from all sources.  If you are taking extra strength Tylenol/acetaminophen (500 mg), the maximum dose is 6 tablets in 24 hours.  If you are taking regular strength acetaminophen (325 mg), the maximum dose is 9 tablets in 24 hours.    Your doctor is:       Dr. Vijaya Padilla, Prostate and Urology: [phone]           Or dial [phone] and ask for the resident on call for:  Prostate Urology  For emergency care, call the:  [place]:  [phone] (TTY for hearing impaired: [phone])

## 2023-04-25 NOTE — OP NOTE
Urology Operative Summary     Pre-operative diagnosis: Urinary urgency/frequency, urge incontinence.   Post-operative diagnosis: Same   Procedure: Percutaneous Neural Exam (Axonics)  Interpretation of fluoroscopic imaging      Surgeon: Vijaya Padilla MD   Assistant(s): Oksana Hawkins MD      Anesthesia: Local anesthesia       Estimated blood loss: Less than 5 ml   Complications: None   Condition: Patient taken to recovery in stable condition.            Indication: 57M with urgency/frequency. After discussing further management options, the patient has elected to proceed with a percutaneous neural examination as a trial for a permanent interstim implant.     Procedure:  The patient was identified correctly, consented and placed in the prone position.  The patient's sacral area was prepped and draped in the usual sterile fashion. Using the fluoroscope in the AP position the medial aspects of the sacral foramena were identified and marked.  The fluoroscope was then placed in the lateral position and the S3 foramen was identified and marked.  Using marcaine with bicarb bilateral insertion sites were injected to anesthetize the skin.  Once this was achieved a 3 1/2 inch needle was inserted on the right side until it was passed through the S3 foramen as seen on fluoroscopy.       Next the needle was tested on the right side and the patient had a sensory response felt at 1.0 mA right buttock.  The same was then done on left and felt at 0.15 mA inner thigh. Left is lower in foramen.     At this point the stilette was removed from the insertion needle and the electrode was passed until the 3 1/2 inch nenita on both sides.  The needle was pulled out leaving the electrode in place.  These were secured down with steri-strips and a dressing was applied.     All appropriate wires were put in place and attached to the generator and the [employer] representative went over instructions with the patient.     Post op sensory  thresholds:  Right side: 1.8 mA right buttock.  Left side: 0.3 mA felt at rectum.     Sent home on left program at 0.3 mA.     Oksana Hawkins MD  Urology Resident     Patient was seen, evaluated and plan was formulated in conjunction with me and I agree with the above.   I was present for the entire procedure.  Vijaya Padilla MD

## 2023-04-27 DIAGNOSIS — R39.89 URETHRAL PAIN: Primary | ICD-10-CM

## 2023-04-27 RX ORDER — PHENAZOPYRIDINE HYDROCHLORIDE 200 MG/1
200 TABLET, FILM COATED ORAL 3 TIMES DAILY PRN
Qty: 10 TABLET | Refills: 0 | Status: SHIPPED | OUTPATIENT
Start: 2023-04-27 | End: 2023-06-07

## 2023-05-01 ENCOUNTER — THERAPY VISIT (OUTPATIENT)
Dept: PHYSICAL THERAPY | Facility: CLINIC | Age: 58
End: 2023-05-01
Attending: ORTHOPAEDIC SURGERY
Payer: COMMERCIAL

## 2023-05-01 DIAGNOSIS — M54.50 CHRONIC MIDLINE LOW BACK PAIN WITHOUT SCIATICA: ICD-10-CM

## 2023-05-01 DIAGNOSIS — G89.29 CHRONIC MIDLINE LOW BACK PAIN WITHOUT SCIATICA: ICD-10-CM

## 2023-05-01 DIAGNOSIS — M54.50 BILATERAL LOW BACK PAIN WITHOUT SCIATICA: ICD-10-CM

## 2023-05-01 PROCEDURE — 97110 THERAPEUTIC EXERCISES: CPT | Mod: GP | Performed by: PHYSICAL THERAPIST

## 2023-05-01 PROCEDURE — 97140 MANUAL THERAPY 1/> REGIONS: CPT | Mod: GP | Performed by: PHYSICAL THERAPIST

## 2023-05-01 PROCEDURE — 97161 PT EVAL LOW COMPLEX 20 MIN: CPT | Mod: GP | Performed by: PHYSICAL THERAPIST

## 2023-05-01 NOTE — PROGRESS NOTES
Physical Therapy Initial Evaluation  Subjective:  The history is provided by the patient. No  was used.   Patient Health History  Lara Lloyd being seen for PT for LB.     Date of Onset: chronic.   Problem occurred: Injury   Pain is reported as 5/10 on pain scale.  General health as reported by patient is good.  Pertinent medical history includes: smoking (has a pacemaker for bladder).   Red flags:  None as reported by patient.  Medical allergies: other. Other medical allergies details: Sulfa.   Surgeries include:  Other. Other surgery history details: hysterectomy.    Current medications:  Pain medication.       Primary job tasks include:  Computer work.                  Therapist Generated HPI Evaluation  Problem details: Patient reports a history of LB for several years.  Reports having an injury that partially ruptured the low back at L5.  After this injury, reports receiving bad chiropractic care that resulted in a full rupture.  Has experienced persistent problems since then.  Symptoms have worsened over the past year.  Saw a spine surgeon and was told surgery is not needed.  Received PT orders 4/21/2023 for core strengthening, LE strengthening, and cardio training..         Type of problem:  Lumbar.    This is a chronic condition.  Condition occurred with:  A fall/slip.  Where condition occurred: at home.  Patient reports pain:  Lumbar spine right and lumbar spine left.  Pain is described as aching and is intermittent.  Pain radiates to:  No radiation. Pain is the same all the time.  Since onset symptoms are unchanged.  Symptoms are exacerbated by bending, sitting and standing (Standing on concrete increases LB.  Able to sit 5-30' depending on day, has to sit on a cushion.  Is restless during the night, disturbed sleep.)  and relieved by activity/movement.  Special tests included:  X-ray and MRI.  Previous treatment includes chiropractic and other (injections.  Chiropractor has  retired.). There was mild (mild relief with injections) improvement following previous treatment.  Restrictions due to condition include:  Working in normal job without restrictions (Uses a standing desk.).  Barriers include:  None as reported by patient.                        Objective:  System         Lumbar/SI Evaluation  ROM:    AROM Lumbar:   Flexion:          Mod loss, pulling  Ext:                    Major loss, central pain   Side Bend:        Left:     Right:   Rotation:           Left:     Right:   Side Glide:        Left:  Min loss, R LBP    Right:  Min loss, NE          Lumbar Myotomes:  normal            Lumbar DTR's:  normal            Lumbar Palpation:    Tenderness present at Left:    Erector Spinae  Tenderness present at Right: Quadratus Lumborum and Erector Spinae                                                     General     ROS    Assessment/Plan:    Patient is a 58 year old female with lumbar complaints.    Patient has the following significant findings with corresponding treatment plan.                Diagnosis 1:  LBP  Pain -  manual therapy, directional preference exercise and home program  Decreased ROM/flexibility - manual therapy, therapeutic exercise and home program  Decreased function - therapeutic activities and home program    Previous and current functional limitations:  (See Goal Flow Sheet for this information)    Short term and Long term goals: (See Goal Flow Sheet for this information)     Communication ability:  Patient appears to be able to clearly communicate and understand verbal and written communication and follow directions correctly.  Treatment Explanation - The following has been discussed with the patient:   RX ordered/plan of care  Anticipated outcomes  Possible risks and side effects  This patient would benefit from PT intervention to resume normal activities.   Rehab potential is good.    Frequency:  1 X week, once daily  Duration:  for 8 weeks  Discharge Plan:   Achieve all LTG.  Independent in home treatment program.  Reach maximal therapeutic benefit.    Please refer to the daily flowsheet for treatment today, total treatment time and time spent performing 1:1 timed codes.

## 2023-05-04 ENCOUNTER — ALLIED HEALTH/NURSE VISIT (OUTPATIENT)
Dept: UROLOGY | Facility: CLINIC | Age: 58
End: 2023-05-04
Payer: COMMERCIAL

## 2023-05-04 DIAGNOSIS — N39.41 URGE INCONTINENCE: ICD-10-CM

## 2023-05-04 DIAGNOSIS — R39.89 URETHRAL PAIN: Primary | ICD-10-CM

## 2023-05-04 DIAGNOSIS — R39.15 URINARY URGENCY: ICD-10-CM

## 2023-05-04 PROCEDURE — 99024 POSTOP FOLLOW-UP VISIT: CPT

## 2023-05-04 NOTE — NURSING NOTE
Patient here to have her device removed post one week trial. Removed device and leads without difficulty and leads removed intact.  Patient would like this writer to request a short term course of narcotics to get her over the intense pain she is having at her urethra. Patient describes her pain as someone squeezing her urethra and shutting off her circulation to the area. She tells me she has tried multiple medications including tylenol, lidocaine, Neurontin, and ibuprofen. Patient says she has a chronic back condition she has from an accident years ago. Patient agreed to have pelvic MRI and see [facility] to help control her pain.   Patient is requesting:    short term oxycodone or tramadol   Continued low dose antibiotics   Surgery for stage II neurostimulator ASAP  Valium for MRI-she knows she needs a   Appointment with Dr Padilla to discuss findings  Will update Dr Padilla   Referral sent   MRI scheduled for 5/10/23  Neurostimulator:  She rates the left side a 8-9/10. Right side was 4/10. Patient prefers non-charging device. She wants to proceed with the stage II permanent device.   The Axonics device did not help the urethral pain. Patient wants to proceed with permanent stage.  Lara Lloyd comes into clinic today at the request of Dr Padilla Ordering Provider for device removal        This service provided today was under the supervising provider of the day Dr Fleming, who was available if needed.    Racquel Ramsey, RN,BSN  Racquel Ramsey, BSN, RN  Care Coordinator Urology  [phone]

## 2023-05-05 DIAGNOSIS — F41.9 ANXIETY: Primary | ICD-10-CM

## 2023-05-05 DIAGNOSIS — R39.15 URINARY URGENCY: Primary | ICD-10-CM

## 2023-05-05 RX ORDER — CEFAZOLIN SODIUM 2 G/50ML
2 SOLUTION INTRAVENOUS
Status: CANCELLED | OUTPATIENT
Start: 2023-05-05

## 2023-05-05 RX ORDER — CEFAZOLIN SODIUM 2 G/50ML
2 SOLUTION INTRAVENOUS SEE ADMIN INSTRUCTIONS
Status: CANCELLED | OUTPATIENT
Start: 2023-05-05

## 2023-05-06 NOTE — NURSING NOTE
Patient calling to discuss getting a script of narcotics. Patient explains she needs something to take the edge off her urethra pain. Patient does explain this  pain is currently at 7/10 but sometimes it's it be 12/10 occasionally. Patient tells me she is upset that doctor has not called over the phone to see what she can do to help her pain. Explained to this patient Dr Padilla is unavailable at this time.   Told patient this writer can get her scheduled to see Dr Padilla after the MRI because at point there be results to discuss and possible treatment plan.  Without narcotics. tried scheduling this patient to the patient for Axonics device but patient wants possible next week. I did explain to the patient insurance would need to be approved before she can proceed unless she wanted to pay upfront.   Told patient this writer would update Dr. Padilla again on narcotics. Patient continued calling this writer over and over while discussing this matter with Dr Padilla. Patient was told I was speaking with the doctor on the other line. Patient continued to call over and over.   Ordered one valium once approval was obtained from Dr Padilla.  Dr Padilla wanted an urgent referral to [facility]  [facility] message with [facility] to have patient seen next week  Discussed last urinalysis with patient   Looking at 6/13 for Axonics device placement. Patient stated she was told be [facility] the wait time surgery was about 2 weeks. Explained [facility] [employer] rep does not work for Dr Padilla or [facility] therefore she has no control over surgery scheduling. Patient started screaming at this writer saying she wanted to go [facility] because they would provide surgery much quicker then what [facility] is offering.   Asked patient what other medications has she tried or what other non-narcotics has she tried. Patient says she was told by another physician not to take Tizanidine and meloxicam together. Asked patient if she still has meloxicam prescription and she  has the pills with her. Suggested she take meloxican now since she has not taken any medications. Patient will try over the weekend and call on Monday    CHANCE WilsonN, RN  Care Coordinator Urology  [phone]

## 2023-05-08 ENCOUNTER — MYC MEDICAL ADVICE (OUTPATIENT)
Dept: UROLOGY | Facility: CLINIC | Age: 58
End: 2023-05-08

## 2023-05-08 RX ORDER — DIAZEPAM 10 MG
10 TABLET ORAL ONCE
Qty: 1 TABLET | Refills: 0 | Status: SHIPPED | OUTPATIENT
Start: 2023-05-08 | End: 2023-05-08

## 2023-05-08 NOTE — PROGRESS NOTES
Spoke with patient today to address the pain clinic referral. Spoke with coordinator for [facility] today. Thais plans to call patient ASAP to see for evaluation. Let me patient know she needed to start something before leaving for home since her plan was to stay until her next procedure.   Patient says the meloxicam she tried over the weekend was helpful which she rates 6/10 with some relief which what she asked for when she requested narcotics.   Patient did another message over the weekend apologizing for her behavior on Friday.   Patient is scheduled for her procedure on 6/13/23 and will come for that unless she can get in sooner. She does have a work trip she is planning at the end of June.   Will inquire on patient getting sooner    CHANCE WilsonN, RN  Care Coordinator Urology  [phone]

## 2023-05-10 ENCOUNTER — HOSPITAL ENCOUNTER (OUTPATIENT)
Dept: MRI IMAGING | Facility: HOSPITAL | Age: 58
Discharge: HOME | End: 2023-05-10
Attending: UROLOGY | Admitting: UROLOGY
Payer: COMMERCIAL

## 2023-05-10 DIAGNOSIS — R39.89 URETHRAL PAIN: ICD-10-CM

## 2023-05-10 PROCEDURE — 72197 MRI PELVIS W/O & W/DYE: CPT

## 2023-05-10 PROCEDURE — A9585 GADOBUTROL INJECTION: HCPCS | Performed by: UROLOGY

## 2023-05-10 PROCEDURE — 255N000002 HC RX 255 OP 636: Performed by: UROLOGY

## 2023-05-10 RX ORDER — GADOBUTROL 604.72 MG/ML
6 INJECTION INTRAVENOUS ONCE
Status: COMPLETED | OUTPATIENT
Start: 2023-05-10 | End: 2023-05-10

## 2023-05-10 RX ADMIN — GADOBUTROL 6 ML: 604.72 INJECTION INTRAVENOUS at 17:58

## 2023-05-11 ENCOUNTER — TRANSFERRED RECORDS (OUTPATIENT)
Dept: HEALTH INFORMATION MANAGEMENT | Facility: CLINIC | Age: 58
End: 2023-05-11
Payer: COMMERCIAL

## 2023-05-15 ENCOUNTER — MYC MEDICAL ADVICE (OUTPATIENT)
Dept: UROLOGY | Facility: CLINIC | Age: 58
End: 2023-05-15
Payer: COMMERCIAL

## 2023-05-16 ENCOUNTER — TELEPHONE (OUTPATIENT)
Dept: UROLOGY | Facility: CLINIC | Age: 58
End: 2023-05-16
Payer: COMMERCIAL

## 2023-05-16 NOTE — TELEPHONE ENCOUNTER
----- Message from Vijaya Padilla MD sent at 5/15/2023 11:34 AM CDT -----  There is nothing there that explains her pain.  She should go see [facility]  ----- Message -----  From: Rcaquel Ramsey RN  Sent: 5/15/2023  10:09 AM CDT  To: Vijaya Padilla MD    Please look at this patient's MRI and comment on what the plan is for her urethral pain. She did see [facility] last week also and is scheduled for AxonFaculte device  Thanks in advance  Андрей

## 2023-05-16 NOTE — TELEPHONE ENCOUNTER
Patient notified on her MRI results. Patient did see [facility] clinic last Thursday in which she was prescribed Valbuca twice daily for 30 days to see if it would ease her urethral pain. Patient started today. There is a plan in place if this does not work. Patient needs pre-op physical since she does not have one closer to home. Patient will return to the [facility] 6/6/23 to prepare for her procedure with Dr Padilla.    Racquel Ramsey, CHANCEN, RN  Care Coordinator Urology  [phone]

## 2023-05-17 NOTE — PROGRESS NOTES
Please see note from 5/16/23 for details    Racquel Ramsey, CHANCEN, RN  Care Coordinator Urology  [phone]

## 2023-05-19 ENCOUNTER — VIRTUAL VISIT (OUTPATIENT)
Dept: NEUROSURGERY | Facility: CLINIC | Age: 58
End: 2023-05-19
Payer: COMMERCIAL

## 2023-05-19 ENCOUNTER — TELEPHONE (OUTPATIENT)
Dept: UROLOGY | Facility: CLINIC | Age: 58
End: 2023-05-19

## 2023-05-19 DIAGNOSIS — M54.50 CHRONIC MIDLINE LOW BACK PAIN WITHOUT SCIATICA: Primary | ICD-10-CM

## 2023-05-19 DIAGNOSIS — G89.29 CHRONIC MIDLINE LOW BACK PAIN WITHOUT SCIATICA: Primary | ICD-10-CM

## 2023-05-19 PROCEDURE — 99442 PR PHYSICIAN TELEPHONE EVALUATION 11-20 MIN: CPT | Performed by: ORTHOPAEDIC SURGERY

## 2023-05-19 NOTE — PROGRESS NOTES
Lara is a 58 year old who is being evaluated via a billable telephone visit.      What phone number would you like to be contacted at? Home  How would you like to obtain your AVS? Chace    Distant Location (provider location):  Off-site  Phone call duration: 16 minutes    Diagnosis: Low Back Pain    I called Lara and again reviewed the non-operative options, in particular I think she would be a candidate for the Intracept procedure for nerve ablation at the L5-S1 disc.  This is different than radiofrequency facet ablation and I explained it is done for discogenic pain.  I asked her to talk to her pain clinic about this.      I don't think she is a good surgical candidate for surgery and I do not recommend any lumbar fusions at this time.

## 2023-05-19 NOTE — LETTER
5/19/2023         RE: Lara Lloyd  [address]  [place] [address]        Dear Colleague,    Thank you for referring your patient, Lara Lloyd, to the [facility] [place]. Please see a copy of my visit note below.    Lara is a 58 year old who is being evaluated via a billable telephone visit.      What phone number would you like to be contacted at? Home  How would you like to obtain your AVS? MyChart    Distant Location (provider location):  Off-site  Phone call duration: 16 minutes    Diagnosis: Low Back Pain    I called Lara and again reviewed the non-operative options, in particular I think she would be a candidate for the Intracept procedure for nerve ablation at the L5-S1 disc.  This is different than radiofrequency facet ablation and I explained it is done for discogenic pain.  I asked her to talk to her pain clinic about this.      I don't think she is a good surgical candidate for surgery and I do not recommend any lumbar fusions at this time.        Again, thank you for allowing me to participate in the care of your patient.        Sincerely,        Tarun Manjarrez MD

## 2023-05-22 NOTE — TELEPHONE ENCOUNTER
FUTURE VISIT INFORMATION      SURGERY INFORMATION:    Date: 6/13/23    [facility]    Surgeon:  Vijaya Padilla MD    Anesthesia Type:  mac    Procedure: INSERTION, SACRAL NERVE STIMULATOR, STAGE 1 AND 2 (Implant permanent lead and non-rechargeable generator for Axonics on the Left)    RECORDS REQUESTED FROM:       Primary Care Provider: Katja

## 2023-06-05 ENCOUNTER — TELEPHONE (OUTPATIENT)
Dept: UROLOGY | Facility: CLINIC | Age: 58
End: 2023-06-05
Payer: COMMERCIAL

## 2023-06-05 NOTE — TELEPHONE ENCOUNTER
Patient is scheduled to have her Axonics device placed. She is scheduled for PAC visit 6/7/23  Patient to call if she has questions    Racquel Ramsey, RN, BSN  Care Coordinator Urology  [facility] [place]  [facility]  [phone]

## 2023-06-07 ENCOUNTER — PRE VISIT (OUTPATIENT)
Dept: SURGERY | Facility: CLINIC | Age: 58
End: 2023-06-07

## 2023-06-07 ENCOUNTER — VIRTUAL VISIT (OUTPATIENT)
Dept: SURGERY | Facility: CLINIC | Age: 58
End: 2023-06-07
Payer: COMMERCIAL

## 2023-06-07 ENCOUNTER — ANESTHESIA EVENT (OUTPATIENT)
Dept: SURGERY | Facility: AMBULATORY SURGERY CENTER | Age: 58
End: 2023-06-07
Payer: COMMERCIAL

## 2023-06-07 ENCOUNTER — LAB (OUTPATIENT)
Dept: LAB | Facility: CLINIC | Age: 58
End: 2023-06-07
Payer: COMMERCIAL

## 2023-06-07 DIAGNOSIS — R32 URINARY INCONTINENCE, UNSPECIFIED TYPE: ICD-10-CM

## 2023-06-07 DIAGNOSIS — R39.15 URINARY URGENCY: ICD-10-CM

## 2023-06-07 DIAGNOSIS — Z01.818 PREOP EXAMINATION: ICD-10-CM

## 2023-06-07 DIAGNOSIS — R33.9 URINARY RETENTION: ICD-10-CM

## 2023-06-07 DIAGNOSIS — Z01.818 PREOP EXAMINATION: Primary | ICD-10-CM

## 2023-06-07 LAB
ANION GAP SERPL CALCULATED.3IONS-SCNC: 12 MMOL/L (ref 7–15)
BUN SERPL-MCNC: 6.2 MG/DL (ref 8–23)
CALCIUM SERPL-MCNC: 9.4 MG/DL (ref 8.8–10.2)
CHLORIDE SERPL-SCNC: 98 MMOL/L (ref 98–107)
CREAT SERPL-MCNC: 0.5 MG/DL (ref 0.51–0.95)
DEPRECATED HCO3 PLAS-SCNC: 24 MMOL/L (ref 22–29)
GFR SERPL CREATININE-BSD FRML MDRD: >90 ML/MIN/1.73M2
GLUCOSE SERPL-MCNC: 88 MG/DL (ref 70–99)
HGB BLD-MCNC: 10 G/DL (ref 11.7–15.7)
POTASSIUM SERPL-SCNC: 4.7 MMOL/L (ref 3.4–5.3)
SODIUM SERPL-SCNC: 134 MMOL/L (ref 136–145)

## 2023-06-07 PROCEDURE — 99203 OFFICE O/P NEW LOW 30 MIN: CPT | Mod: 95 | Performed by: NURSE PRACTITIONER

## 2023-06-07 PROCEDURE — 36415 COLL VENOUS BLD VENIPUNCTURE: CPT

## 2023-06-07 PROCEDURE — 85018 HEMOGLOBIN: CPT

## 2023-06-07 PROCEDURE — 80048 BASIC METABOLIC PNL TOTAL CA: CPT

## 2023-06-07 RX ORDER — BUPRENORPHINE HYDROCHLORIDE 75 UG/1
1 FILM, SOLUBLE BUCCAL 2 TIMES DAILY
COMMUNITY
Start: 2023-05-12 | End: 2024-06-27

## 2023-06-07 ASSESSMENT — LIFESTYLE VARIABLES: TOBACCO_USE: 1

## 2023-06-07 ASSESSMENT — PAIN SCALES - GENERAL: PAINLEVEL: NO PAIN (0)

## 2023-06-07 ASSESSMENT — ENCOUNTER SYMPTOMS: ORTHOPNEA: 0

## 2023-06-07 NOTE — PATIENT INSTRUCTIONS
Preparing for Your Surgery      Name:  Lara Lloyd   MRN:  [id number]   :  1965   Today's Date:  2023         Arriving for surgery:  Surgery date:  23  Arrival time:  10:30AM    Restrictions due to COVID 19:    Please maintain social distance.  Masking is optional      parking is available for anyone with mobility limitations or disabilities. (Monday- Friday 7 am- 5 pm)    Please come to:    [facility]  [address] [place] [id number]    Please check in on the 5th floor at the Ambulatory Surgery Center.      What can I eat or drink?    -  You may eat and drink normally until 8 hours prior to arrival  time. (Until 23, 2:30AM)  -  You may have clear liquids until 2 hours prior to arrival  time. (Until 23, 8:30AM)    Examples of clear liquids:  Water  Clear broth  Juices (apple, white grape, white cranberry  and cider) without pulp  Noncarbonated, powder based beverages  (lemonade and Star-Aid)  Sodas (Sprite, 7-Up, ginger ale and seltzer)  Coffee or tea (without milk or cream)  Gatorade    --No alcohol for at least 24 hours before surgery.    Which medicines can I take?    Hold Aspirin for 7 days before surgery.   Hold Multivitamins for 7 days before surgery.  Hold Supplements for 7 days before surgery.  Hold Ibuprofen (Advil, Motrin) for 1 day before surgery--unless otherwise directed by surgeon.  Hold Naproxen (Aleve) for 4 days before surgery.  Continue to hold Meloxicam(Mobic) prior to surgery.     No alcohol or cannabis products for 24 hours prior to procedure      -  PLEASE TAKE the following medications the day of surgery:     Belbuca buccal film    How do I prepare myself?  - Please take 2 showers (one the night prior to surgery and one the morning of surgery) using Scrubcare or Hibiclens soap.    Use this soap only from the neck to your toes.     Leave the soap on your skin for one minute--then rinse thoroughly.      You may use your own  shampoo and conditioner. No other hair products.   - Please remove all jewelry and body piercings.  - No lotions, deodorants or fragrance.  - No makeup or fingernail polish.   - Bring your ID and insurance card.    -If you have a Deep Brain Stimulator, a Spinal Cord Stimulator, or any implanted Neuro Device, you must bring the remote to the Surgery Center.         ALL PATIENTS ARE REQUIRED TO HAVE A RESPONSIBLE ADULT TO DRIVE AND BE IN ATTENDANCE WITH THEM FOR 24 HOURS FOLLOWING SURGERY.     Covid testing policy as of 12/06/2022  Your surgeon will notify and schedule you for a COVID test if one is needed before surgery--please direct any questions or COVID symptoms to your surgeon      Questions or Concerns:    -For questions regarding the day of surgery, please contact the Ambulatory Surgery Center at [phone].    -If you have health changes between today and your surgery, please contact your surgeon.     - For questions after surgery, please contact your surgeon's office.

## 2023-06-07 NOTE — H&P
Pre-Operative H & P     CC:  Preoperative exam to assess for increased cardiopulmonary risk while undergoing surgery and anesthesia.    Date of Encounter: 6/7/2023  Primary Care Physician:  No Ref-Primary, Physician     Reason for visit:   Encounter Diagnosis   Name Primary?     Preop examination Yes       HPI  Lara Lloyd is a 58 year old female who presents for pre-operative H & P in preparation for  Procedure Information     Case: [id number] Date/Time: 06/13/23 1200    Procedure: INSERTION, SACRAL NERVE STIMULATOR, STAGE 1 AND 2 (Implant permanent lead and non-rechargeable generator for Axonics on the Left) (Left: Sacrum)    Anesthesia type: MAC    Diagnosis: Urinary urgency [R39.15]    Pre-op diagnosis: Urinary urgency [R39.15]    [facility] [place] [address] [facility]    Providers: Vijaya Padilla MD          Laar Lloyd is a 58 year old female with chronic back pain that has idiopathic urinary retention, incontinence, urinary urgency and recurrent UTIs.  Urinary antispasmodic medications have been deferred as it felt they wouldn't be helpful due to the idiopathic nature of her symptoms.  She had a sacral nerve stimulator trial in April and found that it was helpful.  The above listed procedure has now been recommended for management.    History is obtained from the patient and chart review    Hx of abnormal bleeding or anti-platelet use: none    Menstrual history: No LMP recorded. Patient has had a hysterectomy.:      Past Medical History  Past Medical History:   Diagnosis Date     Chronic back pain      Urinary retention      Urinary urgency        Past Surgical History  Past Surgical History:   Procedure Laterality Date     COLONOSCOPY       HYSTERECTOMY       IMPLANT STIMULATOR SACRAL NERVE PERCUTANEOUS TRIAL N/A 04/25/2023    Procedure: INSERTION, NEUROSTIMULATOR, SACRAL, PERCUTANEOUS, FOR TRIAL (trial for axonics);  Surgeon: Vijaya Padilla MD;   Location: [facility] OR       Prior to Admission Medications  Current Outpatient Medications   Medication Sig Dispense Refill     BELBUCA 75 MCG FILM buccal film Place 1 Film inside cheek 2 times daily       meloxicam (MOBIC) 7.5 MG tablet Take 1 tablet (7.5 mg) by mouth daily (Patient not taking: Reported on 2023) 30 tablet 1     tiZANidine (ZANAFLEX) 2 MG tablet Take 1 tablet (2 mg) by mouth At Bedtime (Patient not taking: Reported on 2023) 30 tablet 1       Allergies  Allergies   Allergen Reactions     Formaldehyde Other (See Comments)     unknown     Sulfa Antibiotics Hives     Seasonal Allergies Other (See Comments)     Multiple reactions       Social History  Social History     Socioeconomic History     Marital status:      Spouse name: Not on file     Number of children: 4     Years of education: Not on file     Highest education level: Not on file   Occupational History     Occupation: operations and    Tobacco Use     Smoking status: Former     Packs/day: 1.00     Years: 20.00     Pack years: 20.00     Types: Cigarettes     Quit date:      Years since quittin.4     Smokeless tobacco: Never   Vaping Use     Vaping status: Not on file   Substance and Sexual Activity     Alcohol use: Never     Drug use: Never     Sexual activity: Not on file   Other Topics Concern     Not on file   Social History Narrative     Not on file     Social Determinants of Health     Financial Resource Strain: Not on file   Food Insecurity: Not on file   Transportation Needs: Not on file   Physical Activity: Not on file   Stress: Not on file   Social Connections: Not on file   Intimate Partner Violence: Not on file   Housing Stability: Not on file       Family History  Family History   Problem Relation Age of Onset     Myocardial Infarction Mother      Hyperlipidemia Father      Anesthesia Reaction No family hx of      Thrombosis No family hx of        Review of Systems  The complete review of systems  is negative other than noted in the HPI or here.   Anesthesia Evaluation   Pt has had prior anesthetic.     No history of anesthetic complications       ROS/MED HX  ENT/Pulmonary:     (+) tobacco use, Past use,     Neurologic:  - neg neurologic ROS     Cardiovascular:     (+) -----No previous cardiac testing  (-) BOWSER and orthopnea/PND   METS/Exercise Tolerance: >4 METS Comment: Going to physical therapy for chronic back pain.  Walks 2-4 miles per day for exercise.     Hematologic:     (+) anemia,     Musculoskeletal: Comment: Chronic low back pain      GI/Hepatic:  - neg GI/hepatic ROS     Renal/Genitourinary: Comment: Urinary incontinence, urgency and retention.  Recurrent UTIs.      Endo:  - neg endo ROS     Psychiatric/Substance Use:     (+) H/O chronic opiod use .     Infectious Disease:  - neg infectious disease ROS     Malignancy:  - neg malignancy ROS     Other:  - neg other ROS    (+) , H/O Chronic Pain,        Virtual visit -  No vitals were obtained    Physical Exam  Constitutional: Awake, alert, cooperative, no apparent distress, and appears stated age.  Eyes: Pupils equal  HENT: Normocephalic  Respiratory: non labored breathing   Neurologic: Awake, alert, oriented to name, place and time.   Neuropsychiatric: Calm, cooperative. Normal affect.      Prior Labs/Diagnostic Studies   All labs and imaging personally reviewed     EKG/ stress test - none    Labs:    Component      Latest Ref Rn 6/7/2023  4:14 PM   Sodium      136 - 145 mmol/L 134 (L)    Potassium      3.4 - 5.3 mmol/L 4.7    Chloride      98 - 107 mmol/L 98    Carbon Dioxide (CO2)      22 - 29 mmol/L 24    Anion Gap      7 - 15 mmol/L 12    Urea Nitrogen      8.0 - 23.0 mg/dL 6.2 (L)    Creatinine      0.51 - 0.95 mg/dL 0.50 (L)    Calcium      8.8 - 10.2 mg/dL 9.4    Glucose      70 - 99 mg/dL 88    GFR Estimate      >60 mL/min/1.73m2 >90    Hemoglobin      11.7 - 15.7 g/dL 10.0 (L)       Legend:  (L) Low      The patient's records and results  "personally reviewed by this provider.     Outside records reviewed from: Care Everywhere      Assessment      Lara Lloyd is a 58 year old female seen as a PAC referral for risk assessment and optimization for anesthesia.    Plan/Recommendations  Pt will be optimized for the proposed procedure.  See below for details on the assessment, risk, and preoperative recommendations    NEUROLOGY  - No history of TIA, CVA or seizure  - Chronic Pain  On chronic opiates, morphine equivilant = 4.5 MME/Day   -Post Op delirium risk factors:  No risk identified    ENT  - No current airway concerns.  Will need to be reassessed day of surgery.  Mallampati: Unable to assess  TM: Unable to assess    CARDIAC  - No history of CAD, Hypertension and Afib  - METS (Metabolic Equivalents)  Patient performs 4 or more METS exercise without symptoms            Total Score: 0      RCRI-Very low risk: Class 1 0.4% complication rate            Total Score: 0        PULMONARY    JONY Low Risk            Total Score: 1    JONY: Over 50 ys old      - Denies asthma or inhaler use  - Tobacco History      History   Smoking Status     Former     Packs/day: 1.00     Years: 20.00     Types: Cigarettes     Quit date: 2010   Smokeless Tobacco     Never       GI  - denies GERD  PONV High Risk  Total Score: 3           1 AN PONV: Pt is Female    1 AN PONV: Patient is not a current smoker    1 AN PONV: Intended Post Op Opioids        /RENAL  - urinary urgency, incontinence and retention in addition to recurrent UTIs.  Procedure planned as above.     ENDOCRINE    - BMI: Estimated body mass index is 20.23 kg/m  as calculated from the following:    Height as of 4/25/23: 1.727 m (5' 7.99\").    Weight as of 4/25/23: 60.3 kg (133 lb).  Healthy Weight (BMI 18.5-24.9)  - No history of Diabetes Mellitus     - noted hyponatremia on labs last summer.  No recent recheck.  Patient reports she was drinking too much water at that time.  Will order recheck.     HEME  VTE Low " Risk 0.26%            Total Score: 1    VTE: Greater than 59 yrs old      - No history of abnormal bleeding or antiplatelet use.      MSK  - chronic low back pain.  Consider cautious positioning.         Different anesthesia methods/types have been discussed with the patient, but they are aware that the final plan will be decided by the assigned anesthesia provider on the date of service.      The patient is optimized for their procedure. AVS with information on surgery time/arrival time, meds and NPO status given by nursing staff. No further diagnostic testing indicated.    Please refer to the physical examination documented by the anesthesiologist in the anesthesia record on the day of surgery.    Video-Visit Details    Type of service:  Video Visit    Provider received verbal consent for a Video Visit from the patient? Yes     Originating Location (pt. Location): at her friend's home in [place], [place]    Distant Location (provider location):  Off-site  Mode of Communication:  Video Conference via [employer]     On the day of service:     Prep time: 9 minutes  Visit time: 16 minutes  Documentation time: 10 minutes  ------------------------------------------  Total time: 35 minutes      BERTA Zavala CNP  Preoperative Assessment Center  [facility]  Clinic and Surgery Center  Phone: [phone]  Fax: [phone]

## 2023-06-07 NOTE — PROGRESS NOTES
Lara is a 58 year old who is being evaluated via a billable video visit.      How would you like to obtain your AVS? MyChart  If the video visit is dropped, the invitation should be resent by: Text to cell phone: [phone]            HPI         Review of Systems             Physical Exam

## 2023-06-07 NOTE — ADDENDUM NOTE
Addendum  created 06/07/23 2894 by Tg Boyer APRN CNP    Clinical Note Signed, Diagnosis association updated, Order list changed, Visit diagnoses modified

## 2023-06-08 ENCOUNTER — TRANSFERRED RECORDS (OUTPATIENT)
Dept: HEALTH INFORMATION MANAGEMENT | Facility: CLINIC | Age: 58
End: 2023-06-08
Payer: COMMERCIAL

## 2023-06-08 NOTE — RESULT ENCOUNTER NOTE
Carmenza Bermudez,    Your test results are attached.  Your sodium is still a touch low, but higher than it was last year and is okay for surgery.  Additionally, your hemoglobin is below normal in an anemic range.  It was normal on your labs last year.  This is okay for this particular surgery, but I would recommend that you follow up with your primary care provider after surgery for further evaluation and continued monitoring.         Tg Boyer DNP, RN, ANP-C

## 2023-06-13 ENCOUNTER — ANCILLARY PROCEDURE (OUTPATIENT)
Dept: RADIOLOGY | Facility: AMBULATORY SURGERY CENTER | Age: 58
End: 2023-06-13
Attending: UROLOGY
Payer: COMMERCIAL

## 2023-06-13 ENCOUNTER — HOSPITAL ENCOUNTER (OUTPATIENT)
Facility: AMBULATORY SURGERY CENTER | Age: 58
Discharge: HOME | End: 2023-06-13
Attending: UROLOGY
Payer: COMMERCIAL

## 2023-06-13 ENCOUNTER — ANESTHESIA (OUTPATIENT)
Dept: SURGERY | Facility: AMBULATORY SURGERY CENTER | Age: 58
End: 2023-06-13
Payer: COMMERCIAL

## 2023-06-13 VITALS
DIASTOLIC BLOOD PRESSURE: 81 MMHG | HEIGHT: 67 IN | BODY MASS INDEX: 21.97 KG/M2 | SYSTOLIC BLOOD PRESSURE: 146 MMHG | TEMPERATURE: 97 F | HEART RATE: 76 BPM | RESPIRATION RATE: 16 BRPM | WEIGHT: 140 LBS | OXYGEN SATURATION: 99 %

## 2023-06-13 DIAGNOSIS — R33.9 URINARY RETENTION: Primary | ICD-10-CM

## 2023-06-13 DIAGNOSIS — R52 PAIN: ICD-10-CM

## 2023-06-13 PROCEDURE — 64561 IMPLANT NEUROELECTRODES: CPT | Mod: LT

## 2023-06-13 PROCEDURE — C1767 GENERATOR, NEURO NON-RECHARG: HCPCS | Mod: ZZSP

## 2023-06-13 PROCEDURE — C1778 LEAD, NEUROSTIMULATOR: HCPCS | Mod: ZZSP

## 2023-06-13 PROCEDURE — 64590 INS/RPL PRPH SAC/GSTR NPG/R: CPT

## 2023-06-13 PROCEDURE — 64561 IMPLANT NEUROELECTRODES: CPT | Mod: LT | Performed by: UROLOGY

## 2023-06-13 PROCEDURE — 64590 INS/RPL PRPH SAC/GSTR NPG/R: CPT | Mod: GC | Performed by: UROLOGY

## 2023-06-13 DEVICE — IMP NRSTM AXONICS SNM SYSTEM 4101: Type: IMPLANTABLE DEVICE | Site: BACK | Status: FUNCTIONAL

## 2023-06-13 DEVICE — KIT NRSTM AXONICS TINE LEAD CURVED STYLET 1201: Type: IMPLANTABLE DEVICE | Site: BACK | Status: FUNCTIONAL

## 2023-06-13 RX ORDER — GLYCOPYRROLATE 0.2 MG/ML
INJECTION, SOLUTION INTRAMUSCULAR; INTRAVENOUS PRN
Status: DISCONTINUED | OUTPATIENT
Start: 2023-06-13 | End: 2023-06-13

## 2023-06-13 RX ORDER — OXYCODONE HYDROCHLORIDE 5 MG/1
5 TABLET ORAL
Status: COMPLETED | OUTPATIENT
Start: 2023-06-13 | End: 2023-06-13

## 2023-06-13 RX ORDER — ONDANSETRON 4 MG/1
4 TABLET, ORALLY DISINTEGRATING ORAL EVERY 30 MIN PRN
Status: DISCONTINUED | OUTPATIENT
Start: 2023-06-13 | End: 2023-06-14 | Stop reason: HOSPADM

## 2023-06-13 RX ORDER — CEFAZOLIN SODIUM 2 G/50ML
2 SOLUTION INTRAVENOUS SEE ADMIN INSTRUCTIONS
Status: DISCONTINUED | OUTPATIENT
Start: 2023-06-13 | End: 2023-06-13 | Stop reason: HOSPADM

## 2023-06-13 RX ORDER — KETAMINE HYDROCHLORIDE 10 MG/ML
INJECTION INTRAMUSCULAR; INTRAVENOUS PRN
Status: DISCONTINUED | OUTPATIENT
Start: 2023-06-13 | End: 2023-06-13

## 2023-06-13 RX ORDER — PROPOFOL 10 MG/ML
INJECTION, EMULSION INTRAVENOUS CONTINUOUS PRN
Status: DISCONTINUED | OUTPATIENT
Start: 2023-06-13 | End: 2023-06-13

## 2023-06-13 RX ORDER — POLYETHYLENE GLYCOL 3350 17 G/17G
1 POWDER, FOR SOLUTION ORAL DAILY
Qty: 255 G | Refills: 0 | Status: SHIPPED | OUTPATIENT
Start: 2023-06-13 | End: 2024-06-27

## 2023-06-13 RX ORDER — FENTANYL CITRATE 50 UG/ML
50 INJECTION, SOLUTION INTRAMUSCULAR; INTRAVENOUS EVERY 5 MIN PRN
Status: DISCONTINUED | OUTPATIENT
Start: 2023-06-13 | End: 2023-06-14 | Stop reason: HOSPADM

## 2023-06-13 RX ORDER — FENTANYL CITRATE 50 UG/ML
25 INJECTION, SOLUTION INTRAMUSCULAR; INTRAVENOUS EVERY 5 MIN PRN
Status: DISCONTINUED | OUTPATIENT
Start: 2023-06-13 | End: 2023-06-14 | Stop reason: HOSPADM

## 2023-06-13 RX ORDER — PROPOFOL 10 MG/ML
INJECTION, EMULSION INTRAVENOUS PRN
Status: DISCONTINUED | OUTPATIENT
Start: 2023-06-13 | End: 2023-06-13

## 2023-06-13 RX ORDER — ONDANSETRON 2 MG/ML
4 INJECTION INTRAMUSCULAR; INTRAVENOUS EVERY 30 MIN PRN
Status: DISCONTINUED | OUTPATIENT
Start: 2023-06-13 | End: 2023-06-14 | Stop reason: HOSPADM

## 2023-06-13 RX ORDER — LIDOCAINE 40 MG/G
CREAM TOPICAL
Status: DISCONTINUED | OUTPATIENT
Start: 2023-06-13 | End: 2023-06-13 | Stop reason: HOSPADM

## 2023-06-13 RX ORDER — CEFAZOLIN SODIUM 2 G/50ML
2 SOLUTION INTRAVENOUS
Status: COMPLETED | OUTPATIENT
Start: 2023-06-13 | End: 2023-06-13

## 2023-06-13 RX ORDER — HYDROMORPHONE HYDROCHLORIDE 1 MG/ML
0.2 INJECTION, SOLUTION INTRAMUSCULAR; INTRAVENOUS; SUBCUTANEOUS EVERY 5 MIN PRN
Status: DISCONTINUED | OUTPATIENT
Start: 2023-06-13 | End: 2023-06-14 | Stop reason: HOSPADM

## 2023-06-13 RX ORDER — SODIUM CHLORIDE, SODIUM LACTATE, POTASSIUM CHLORIDE, CALCIUM CHLORIDE 600; 310; 30; 20 MG/100ML; MG/100ML; MG/100ML; MG/100ML
INJECTION, SOLUTION INTRAVENOUS CONTINUOUS
Status: DISCONTINUED | OUTPATIENT
Start: 2023-06-13 | End: 2023-06-13 | Stop reason: HOSPADM

## 2023-06-13 RX ORDER — SODIUM CHLORIDE, SODIUM LACTATE, POTASSIUM CHLORIDE, CALCIUM CHLORIDE 600; 310; 30; 20 MG/100ML; MG/100ML; MG/100ML; MG/100ML
INJECTION, SOLUTION INTRAVENOUS CONTINUOUS
Status: DISCONTINUED | OUTPATIENT
Start: 2023-06-13 | End: 2023-06-14 | Stop reason: HOSPADM

## 2023-06-13 RX ORDER — OXYCODONE HYDROCHLORIDE 5 MG/1
5 TABLET ORAL EVERY 6 HOURS PRN
Qty: 6 TABLET | Refills: 0 | Status: SHIPPED | OUTPATIENT
Start: 2023-06-13 | End: 2023-06-16

## 2023-06-13 RX ORDER — LIDOCAINE HYDROCHLORIDE 20 MG/ML
INJECTION, SOLUTION INFILTRATION; PERINEURAL PRN
Status: DISCONTINUED | OUTPATIENT
Start: 2023-06-13 | End: 2023-06-13

## 2023-06-13 RX ORDER — ONDANSETRON 2 MG/ML
INJECTION INTRAMUSCULAR; INTRAVENOUS PRN
Status: DISCONTINUED | OUTPATIENT
Start: 2023-06-13 | End: 2023-06-13

## 2023-06-13 RX ORDER — KETOROLAC TROMETHAMINE 30 MG/ML
INJECTION, SOLUTION INTRAMUSCULAR; INTRAVENOUS PRN
Status: DISCONTINUED | OUTPATIENT
Start: 2023-06-13 | End: 2023-06-13

## 2023-06-13 RX ORDER — ACETAMINOPHEN 325 MG/1
975 TABLET ORAL ONCE
Status: COMPLETED | OUTPATIENT
Start: 2023-06-13 | End: 2023-06-13

## 2023-06-13 RX ORDER — HYDROMORPHONE HYDROCHLORIDE 1 MG/ML
0.4 INJECTION, SOLUTION INTRAMUSCULAR; INTRAVENOUS; SUBCUTANEOUS EVERY 5 MIN PRN
Status: DISCONTINUED | OUTPATIENT
Start: 2023-06-13 | End: 2023-06-14 | Stop reason: HOSPADM

## 2023-06-13 RX ORDER — BUPIVACAINE HYDROCHLORIDE 2.5 MG/ML
INJECTION, SOLUTION EPIDURAL; INFILTRATION; INTRACAUDAL PRN
Status: DISCONTINUED | OUTPATIENT
Start: 2023-06-13 | End: 2023-06-13 | Stop reason: HOSPADM

## 2023-06-13 RX ADMIN — PROPOFOL 50 MG: 10 INJECTION, EMULSION INTRAVENOUS at 11:45

## 2023-06-13 RX ADMIN — PROPOFOL 200 MCG/KG/MIN: 10 INJECTION, EMULSION INTRAVENOUS at 11:13

## 2023-06-13 RX ADMIN — ONDANSETRON 4 MG: 2 INJECTION INTRAMUSCULAR; INTRAVENOUS at 11:09

## 2023-06-13 RX ADMIN — GLYCOPYRROLATE 0.2 MG: 0.2 INJECTION, SOLUTION INTRAMUSCULAR; INTRAVENOUS at 11:09

## 2023-06-13 RX ADMIN — KETOROLAC TROMETHAMINE 30 MG: 30 INJECTION, SOLUTION INTRAMUSCULAR; INTRAVENOUS at 11:34

## 2023-06-13 RX ADMIN — FENTANYL CITRATE 50 MCG: 50 INJECTION, SOLUTION INTRAMUSCULAR; INTRAVENOUS at 13:00

## 2023-06-13 RX ADMIN — FENTANYL CITRATE 50 MCG: 50 INJECTION, SOLUTION INTRAMUSCULAR; INTRAVENOUS at 13:06

## 2023-06-13 RX ADMIN — PROPOFOL 50 MG: 10 INJECTION, EMULSION INTRAVENOUS at 11:30

## 2023-06-13 RX ADMIN — FENTANYL CITRATE 50 MCG: 50 INJECTION, SOLUTION INTRAMUSCULAR; INTRAVENOUS at 12:52

## 2023-06-13 RX ADMIN — KETAMINE HYDROCHLORIDE 10 MG: 10 INJECTION INTRAMUSCULAR; INTRAVENOUS at 11:27

## 2023-06-13 RX ADMIN — HYDROMORPHONE HYDROCHLORIDE 0.2 MG: 1 INJECTION, SOLUTION INTRAMUSCULAR; INTRAVENOUS; SUBCUTANEOUS at 13:37

## 2023-06-13 RX ADMIN — KETAMINE HYDROCHLORIDE 10 MG: 10 INJECTION INTRAMUSCULAR; INTRAVENOUS at 11:21

## 2023-06-13 RX ADMIN — LIDOCAINE HYDROCHLORIDE 100 MG: 20 INJECTION, SOLUTION INFILTRATION; PERINEURAL at 11:12

## 2023-06-13 RX ADMIN — HYDROMORPHONE HYDROCHLORIDE 0.4 MG: 1 INJECTION, SOLUTION INTRAMUSCULAR; INTRAVENOUS; SUBCUTANEOUS at 13:31

## 2023-06-13 RX ADMIN — FENTANYL CITRATE 50 MCG: 50 INJECTION, SOLUTION INTRAMUSCULAR; INTRAVENOUS at 13:15

## 2023-06-13 RX ADMIN — PROPOFOL 100 MG: 10 INJECTION, EMULSION INTRAVENOUS at 11:14

## 2023-06-13 RX ADMIN — SODIUM CHLORIDE, SODIUM LACTATE, POTASSIUM CHLORIDE, CALCIUM CHLORIDE: 600; 310; 30; 20 INJECTION, SOLUTION INTRAVENOUS at 10:54

## 2023-06-13 RX ADMIN — CEFAZOLIN SODIUM 2 G: 2 SOLUTION INTRAVENOUS at 11:06

## 2023-06-13 RX ADMIN — HYDROMORPHONE HYDROCHLORIDE 0.4 MG: 1 INJECTION, SOLUTION INTRAMUSCULAR; INTRAVENOUS; SUBCUTANEOUS at 13:25

## 2023-06-13 RX ADMIN — ACETAMINOPHEN 975 MG: 325 TABLET ORAL at 10:54

## 2023-06-13 RX ADMIN — OXYCODONE HYDROCHLORIDE 5 MG: 5 TABLET ORAL at 12:35

## 2023-06-13 ASSESSMENT — LIFESTYLE VARIABLES: TOBACCO_USE: 1

## 2023-06-13 ASSESSMENT — ENCOUNTER SYMPTOMS: ORTHOPNEA: 0

## 2023-06-13 NOTE — ANESTHESIA CARE TRANSFER NOTE
Patient: Lara Lloyd    Procedure: Procedure(s):  INSERTION, SACRAL NERVE STIMULATOR, STAGE 1 AND 2 (Implant permanent lead and non-rechargeable generator for Axonics on the Left)       Diagnosis: Urinary urgency [R39.15]  Diagnosis Additional Information: No value filed.    Anesthesia Type:   MAC     Note:    Oropharynx: oropharynx clear of all foreign objects and spontaneously breathing  Level of Consciousness: awake  Oxygen Supplementation: room air    Independent Airway: airway patency satisfactory and stable  Dentition: dentition unchanged  Vital Signs Stable: post-procedure vital signs reviewed and stable  Report to RN Given: handoff report given  Patient transferred to: Phase II    Handoff Report: Identifed the Patient, Identified the Reponsible Provider, Reviewed the pertinent medical history, Discussed the surgical course, Reviewed Intra-OP anesthesia mangement and issues during anesthesia, Set expectations for post-procedure period and Allowed opportunity for questions and acknowledgement of understanding      Vitals:  Vitals Value Taken Time   BP     Temp     Pulse     Resp     SpO2         Electronically Signed By: BERTA Diaz CRNA  June 13, 2023  12:06 PM

## 2023-06-13 NOTE — OP NOTE
Operative Report 6/13/23     Pre-operative diagnosis: Urinary urgency and urge incontinence   Post-operative diagnosis: Same   Procedure: Stage I and II Axonics placement (left side, non-rechargeable)  Interpretation of fluoroscopic imaging      Surgeon:  Assistant: MD Coni Lea MD       Anesthesia: Local anesthesia with MAC sedation      Estimated blood loss: 2 ml   Complications: None   Condition: Patient taken to recovery in stable condition.      Findings: Placed tined lead in the left S3 foramen with biplanar flouroscopic guidance. Non-rechargeable generator in high left buttock.       Indications: Lara Lloyd is a 58 year old woman with urinary urgency and frequency refractory to conservative treatment. She has elected to proceed with Axonics placement after successful PNE trial. She understands the risks for infection, pain, bleeding, and the need for future procedures and risks of anesthesia. She received IV antibiotics prior to the procedure initiation.     Procedure: The patient was identified correctly, consented and placed in the prone position. The patient's sacral area was prepped and draped in the usual sterile fashion. Using the fluoroscope in the AP position the medial aspects of the sacral foramena were identified and marked. The fluoroscope was placed in the lateral position and the S3 foramen was identified and marked.      0.25% plain marcaine was injected at the insertion site to anesthetize the skin. Once this was achieved a 3 1/2 inch needle was inserted on the left side until it was passed through the S3 foramen as seen on fluoroscopy. Needle 1 had carlton response at 0.4 mA, no toe. At this point the stilette was removed from the insertion needle and passer was placed. Her incision was widened to allow the dilator through which our permanent electrode was passed until the appropriate position on fluoroscopy. The electrode was tested and found to have good  motor responses. Winter response at respective leads: 0 -  1.0 mA , 1 - 0.4 mA; 2  - 0.6 mA; 3 - 0.5 mA. Toes response was not seen for any lead.     The dilator was then removed under fluoroscopy ensuring our electrode was not displaced. At this point a second incision approximately 3 cm in length was created at the high left buttocks after anesthetizing the skin with additional marcaine. A space was made in the subcutaneous fat for generator placement. We tunneled the lead connection to this incision and connected it to the generator. The generator was then placed in the pocket under the skin and care was taken to ensure that the lead was not over the generator. The midline incision and the left buttock incision were then closed with 3-0 vicryl in two layers. The generator site skin was closed with 3-0 stratafix. Surgical glue was used to close both sites. Small island dressings were then placed over these sites.     The patient was awoken from anesthesia and returned to the supine position. All instrument and counts were correct at the end of the procedure.     Post-operative Plan: Patient will receive teaching from [employer] [facility] in PACU and follow up for a wound check in clinic in two weeks.    Postoperative thresholds:  Contact 0 - 0.9 mA felt at lower buttock  Contact 1 - 1.3 mA felt at buttock  Contact 2 - 1.5 mA felt at lower buttock  Contact 3 - 0.35 mA felt at vagina    Patient discharged on program 3 at 0.35 mA.    Patient was seen, evaluated and plan was formulated in conjunction with me and I agree with the above.  I was present for the entire procedure.  Vijaya Padilla MD

## 2023-06-13 NOTE — DISCHARGE INSTRUCTIONS
[facility]  Home Care Following Anesthesia  For 24 hours after surgery:  Get plenty of rest.  A responsible adult must stay with you for at least 24 hours after you leave the surgery center.  Do not drive or use heavy equipment.  If you have weakness or tingling, don't drive or use heavy equipment until this feeling goes away.   Do not drink alcohol.   Avoid strenuous or risky activities.  Ask for help when climbing stairs.  You may feel lightheaded.  IF so, sit for a few minutes before standing.  Have someone help you get up.   If you have nausea (feel sick to your stomach): Drink only clear liquids such as apple juice, ginger ale, broth or 7-Up.  Rest may also help.  Be sure to drink enough fluids.  Move to a regular diet as you feel able.   You may have a slight fever.  Call the doctor if your fever is over 100 F (37.7 C) (taken under the tongue) or lasts longer than 24 hours.  You may have a dry mouth, a sore throat, muscle aches or trouble sleeping. These should go away after 24 hours.  Do not make important or legal decisions.   It is recommended to avoid smoking.               Tips for taking pain medications  To get the best pain relief possible, remember these points:  Take pain medications as directed, before pain becomes severe.  Pain medication can upset your stomach: taking it with food may help.  Constipation is a common side effect of pain medication. Drink plenty of  fluids.  Eat foods high in fiber. Take a stool softener if recommended by your doctor or pharmacist.  Do not drink alcohol, drive or operate machinery while taking pain medications.  Ask about other ways to control pain, such as with heat, ice or relaxation.    Tylenol/Acetaminophen Consumption  To help encourage the safe use of acetaminophen, the makers of TYLENOL  have lowered the maximum daily dose for single-ingredient Extra Strength TYLENOL  (acetaminophen) products sold in the [place] from 8 pills  per day (4,000 mg) to 6 pills per day (3,000 mg). The dosing interval has also changed from 2 pills every 4-6 hours to 2 pills every 6 hours.  If you feel your pain relief is insufficient, you may take Tylenol/Acetaminophen in addition to your narcotic pain medication.   Be careful not to exceed 3,000 mg of Tylenol/Acetaminophen in a 24 hour period from all sources.  If you are taking extra strength Tylenol/acetaminophen (500 mg), the maximum dose is 6 tablets in 24 hours.  If you are taking regular strength acetaminophen (325 mg), the maximum dose is 9 tablets in 24 hours.    Call a doctor for any of the following:  Signs of infection (fever, growing tenderness at the surgery site, a large amount of drainage or bleeding, severe pain, foul-smelling drainage, redness, swelling).  It has been over 8 to 10 hours since surgery and you are still not able to urinate (pass water).  Headache for over 24 hours.  Numbness, tingling or weakness the day after surgery (if you had spinal anesthesia).  Signs of Covid-19 infection (temperature over 100 degrees, shortness of breath, cough, loss of taste/smell, generalized body aches, persistent headache, chills, sore throat, nausea/vomiting/diarrhea)  Your doctor is:       Dr. Vijaya Padilla, Prostate and Urology: [phone]               Or dial [phone] and ask for the resident on call for:  Prostate Urology  For emergency care, call the:  [facility]:  [phone] (TTY for hearing impaired: [phone])

## 2023-06-13 NOTE — ANESTHESIA PREPROCEDURE EVALUATION
Pre-Operative H & P     CC:  Preoperative exam to assess for increased cardiopulmonary risk while undergoing surgery and anesthesia.    Date of Encounter: 6/7/2023  Primary Care Physician:  No Ref-Primary, Physician     Reason for visit:   No diagnosis found.    HPI  Lara Lloyd is a 58 year old female who presents for pre-operative H & P in preparation for  Procedure Information     Case: [id number] Date/Time: 06/13/23 1150    Procedure: INSERTION, SACRAL NERVE STIMULATOR, STAGE 1 AND 2 (Implant permanent lead and non-rechargeable generator for Axonics on the Left) (Left: Sacrum)    Anesthesia type: MAC    Diagnosis: Urinary urgency [R39.15]    Pre-op diagnosis: Urinary urgency [R39.15]    [place] [facility]  [place] [facility]    Providers: Vijaya Padilla MD          Lara Lloyd is a 58 year old female with chronic back pain that has idiopathic urinary retention, incontinence, urinary urgency and recurrent UTIs.  Urinary antispasmodic medications have been deferred as it felt they wouldn't be helpful due to the idiopathic nature of her symptoms.  She had a sacral nerve stimulator trial in April and found that it was helpful.  The above listed procedure has now been recommended for management.    History is obtained from the patient and chart review    Hx of abnormal bleeding or anti-platelet use: none    Menstrual history: No LMP recorded. Patient has had a hysterectomy.:      Past Medical History  Past Medical History:   Diagnosis Date     Chronic back pain      Urinary retention      Urinary urgency        Past Surgical History  Past Surgical History:   Procedure Laterality Date     COLONOSCOPY       HYSTERECTOMY       IMPLANT STIMULATOR SACRAL NERVE PERCUTANEOUS TRIAL N/A 04/25/2023    Procedure: INSERTION, NEUROSTIMULATOR, SACRAL, PERCUTANEOUS, FOR TRIAL (trial for axonics);  Surgeon: Vijaya Padilla MD;  [place] [facility] [place]       Prior to Admission  Medications  Current Outpatient Medications   Medication Sig Dispense Refill     BELBUCA 75 MCG FILM buccal film Place 1 Film inside cheek 2 times daily       meloxicam (MOBIC) 7.5 MG tablet Take 1 tablet (7.5 mg) by mouth daily 30 tablet 1     oxyCODONE (ROXICODONE) 5 MG tablet Take 1 tablet (5 mg) by mouth every 6 hours as needed for severe pain 6 tablet 0     polyethylene glycol (MIRALAX) 17 GM/Dose powder Take 17 g (1 Capful) by mouth daily 255 g 0     tiZANidine (ZANAFLEX) 2 MG tablet Take 1 tablet (2 mg) by mouth At Bedtime 30 tablet 1       Allergies  Allergies   Allergen Reactions     Formaldehyde Other (See Comments)     unknown     Sulfa Antibiotics Hives     Seasonal Allergies Other (See Comments)     Multiple reactions       Social History  Social History     Socioeconomic History     Marital status:      Spouse name: Not on file     Number of children: 4     Years of education: Not on file     Highest education level: Not on file   Occupational History     Occupation: operations and    Tobacco Use     Smoking status: Former     Packs/day: 1.00     Years: 20.00     Pack years: 20.00     Types: Cigarettes     Quit date:      Years since quittin.4     Smokeless tobacco: Never   Vaping Use     Vaping status: Not on file   Substance and Sexual Activity     Alcohol use: Never     Drug use: Never     Sexual activity: Not on file   Other Topics Concern     Not on file   Social History Narrative     Not on file     Social Determinants of Health     Financial Resource Strain: Not on file   Food Insecurity: Not on file   Transportation Needs: Not on file   Physical Activity: Not on file   Stress: Not on file   Social Connections: Not on file   Intimate Partner Violence: Not on file   Housing Stability: Not on file       Family History  Family History   Problem Relation Age of Onset     Myocardial Infarction Mother      Hyperlipidemia Father      Anesthesia Reaction No family hx of       Thrombosis No family hx of        Review of Systems  The complete review of systems is negative other than noted in the HPI or here.   Anesthesia Evaluation   Pt has had prior anesthetic.     No history of anesthetic complications       ROS/MED HX  ENT/Pulmonary:     (+) tobacco use, Past use,     Neurologic:  - neg neurologic ROS     Cardiovascular:     (+) -----No previous cardiac testing  (-) BOWSER and orthopnea/PND   METS/Exercise Tolerance: >4 METS Comment: Going to physical therapy for chronic back pain.  Walks 2-4 miles per day for exercise.     Hematologic:     (+) anemia,     Musculoskeletal: Comment: Chronic low back pain      GI/Hepatic:  - neg GI/hepatic ROS     Renal/Genitourinary: Comment: Urinary incontinence, urgency and retention.  Recurrent UTIs.      Endo:  - neg endo ROS     Psychiatric/Substance Use:     (+) H/O chronic opiod use  (Now on Belbuca only).     Infectious Disease:  - neg infectious disease ROS     Malignancy:  - neg malignancy ROS     Other:  - neg other ROS    (+) , H/O Chronic Pain,        Virtual visit -  No vitals were obtained    Physical Exam  Constitutional: Awake, alert, cooperative, no apparent distress, and appears stated age.  Eyes: Pupils equal  HENT: Normocephalic  Respiratory: non labored breathing   Neurologic: Awake, alert, oriented to name, place and time.   Neuropsychiatric: Calm, cooperative. Normal affect.      Prior Labs/Diagnostic Studies   All labs and imaging personally reviewed     EKG/ stress test - none    Labs:    Component      Latest Ref Rn 6/7/2023  4:14 PM   Sodium      136 - 145 mmol/L 134 (L)    Potassium      3.4 - 5.3 mmol/L 4.7    Chloride      98 - 107 mmol/L 98    Carbon Dioxide (CO2)      22 - 29 mmol/L 24    Anion Gap      7 - 15 mmol/L 12    Urea Nitrogen      8.0 - 23.0 mg/dL 6.2 (L)    Creatinine      0.51 - 0.95 mg/dL 0.50 (L)    Calcium      8.8 - 10.2 mg/dL 9.4    Glucose      70 - 99 mg/dL 88    GFR Estimate      >60 mL/min/1.73m2  ">90    Hemoglobin      11.7 - 15.7 g/dL 10.0 (L)       Legend:  (L) Low      The patient's records and results personally reviewed by this provider.     Outside records reviewed from: Care Everywhere      Assessment      Lara Lloyd is a 58 year old female seen as a PAC referral for risk assessment and optimization for anesthesia.    Plan/Recommendations  Pt will be optimized for the proposed procedure.  See below for details on the assessment, risk, and preoperative recommendations    NEUROLOGY  - No history of TIA, CVA or seizure  - Chronic Pain  On chronic opiates, morphine equivilant = 4.5 MME/Day   -Post Op delirium risk factors:  No risk identified    ENT  - No current airway concerns.  Will need to be reassessed day of surgery.  Mallampati: Unable to assess  TM: Unable to assess    CARDIAC  - No history of CAD, Hypertension and Afib  - METS (Metabolic Equivalents)  Patient performs 4 or more METS exercise without symptoms            Total Score: 0      RCRI-Very low risk: Class 1 0.4% complication rate            Total Score: 0        PULMONARY    JONY Low Risk            Total Score: 1    JONY: Over 50 ys old      - Denies asthma or inhaler use  - Tobacco History      History   Smoking Status     Former     Packs/day: 1.00     Years: 20.00     Types: Cigarettes     Quit date: 2010   Smokeless Tobacco     Never       GI  - denies GERD  PONV High Risk  Total Score: 3           1 AN PONV: Pt is Female    1 AN PONV: Patient is not a current smoker    1 AN PONV: Intended Post Op Opioids        /RENAL  - urinary urgency, incontinence and retention in addition to recurrent UTIs.  Procedure planned as above.     ENDOCRINE    - BMI: Estimated body mass index is 21.93 kg/m  as calculated from the following:    Height as of an earlier encounter on 6/13/23: 1.702 m (5' 7\").    Weight as of an earlier encounter on 6/13/23: 63.5 kg (140 lb).  Healthy Weight (BMI 18.5-24.9)  - No history of Diabetes Mellitus     - noted " hyponatremia on labs last summer.  No recent recheck.  Patient reports she was drinking too much water at that time.  Will order recheck.     HEME  VTE Low Risk 0.26%            Total Score: 1    VTE: Greater than 59 yrs old      - No history of abnormal bleeding or antiplatelet use.      MSK  - chronic low back pain.  Consider cautious positioning.         Different anesthesia methods/types have been discussed with the patient, but they are aware that the final plan will be decided by the assigned anesthesia provider on the date of service.      The patient is optimized for their procedure. AVS with information on surgery time/arrival time, meds and NPO status given by nursing staff. No further diagnostic testing indicated.    Please refer to the physical examination documented by the anesthesiologist in the anesthesia record on the day of surgery.    Video-Visit Details    Type of service:  Video Visit    Provider received verbal consent for a Video Visit from the patient? Yes     Originating Location (pt. Location): at her friend's home in [place], [place]    Distant Location (provider location):  Off-site  Mode of Communication:  Video Conference via [employer]     On the day of service:     Prep time: 9 minutes  Visit time: 16 minutes  Documentation time: 10 minutes  ------------------------------------------  Total time: 35 minutes      BERTA Zavala CNP  Preoperative Assessment Center  [facility]  Clinic and Surgery Center  Phone: [phone]  Fax: [phone]    Physical Exam    Airway        Mallampati: II   TM distance: > 3 FB   Neck ROM: full   Mouth opening: > 3 cm    Respiratory Devices and Support         Dental       (+) Minor Abnormalities - some fillings, tiny chips      Cardiovascular             Pulmonary                     Anesthesia Plan    ASA Status:  2   NPO Status:  NPO Appropriate    Anesthesia Type: MAC.     - Reason for MAC: straight local not clinically  adequate   Induction: Intravenous, Propofol.   Maintenance: TIVA.        Consents    Anesthesia Plan(s) and associated risks, benefits, and realistic alternatives discussed. Questions answered and patient/representative(s) expressed understanding.    - Discussed:     - Discussed with:  Patient      - Extended Intubation/Ventilatory Support Discussed: No.      - Patient is DNR/DNI Status: No         Postoperative Care    Pain management: IV analgesics, Oral pain medications, Multi-modal analgesia.   PONV prophylaxis: Ondansetron (or other 5HT-3), Background Propofol Infusion     Comments:

## 2023-06-15 ENCOUNTER — TRANSFERRED RECORDS (OUTPATIENT)
Dept: HEALTH INFORMATION MANAGEMENT | Facility: CLINIC | Age: 58
End: 2023-06-15
Payer: COMMERCIAL

## 2023-06-23 ENCOUNTER — PRE VISIT (OUTPATIENT)
Dept: UROLOGY | Facility: CLINIC | Age: 58
End: 2023-06-23
Payer: COMMERCIAL

## 2023-06-23 NOTE — TELEPHONE ENCOUNTER
Reason for Visit: Post-op    Diagnosis: Urinary urgency    Rooming Requirements: Yohan Rand  06/23/23  10:12 AM

## 2023-06-27 ENCOUNTER — TRANSFERRED RECORDS (OUTPATIENT)
Dept: HEALTH INFORMATION MANAGEMENT | Facility: CLINIC | Age: 58
End: 2023-06-27
Payer: COMMERCIAL

## 2023-06-28 ENCOUNTER — VIRTUAL VISIT (OUTPATIENT)
Dept: UROLOGY | Facility: CLINIC | Age: 58
End: 2023-06-28
Payer: COMMERCIAL

## 2023-06-28 DIAGNOSIS — N95.8 GENITOURINARY SYNDROME OF MENOPAUSE: Primary | ICD-10-CM

## 2023-06-28 PROCEDURE — 99212 OFFICE O/P EST SF 10 MIN: CPT | Mod: VID | Performed by: UROLOGY

## 2023-06-28 RX ORDER — ESTRADIOL 0.1 MG/G
2 CREAM VAGINAL
Qty: 42.5 G | Refills: 11 | Status: SHIPPED | OUTPATIENT
Start: 2023-06-29 | End: 2024-06-27

## 2023-06-28 NOTE — LETTER
6/28/2023       RE: Lara Lloyd  [address]  [place] [address]     Dear Colleague,    Thank you for referring your patient, Lara Lloyd, to the [facility]OLOG[facility] [place] at [facility]. Please see a copy of my visit note below.    Virtual Visit Details    Type of service:  Video Visit     Originating Location (pt. Location): Home    Distant Location (provider location):  Off-site  Platform used for Video Visit: Kimani    Reason for visit:  F/u on SNS implant on 6/13/23    Clinical Data:  Ms. Lloyd is a 58 year old female with urinary urgency with minor urge incontinence and stress urinary incontinence.  She had UDS which showed an acontractile bladder.  This was done in [place].    She underwent above implant and reports that the device is working well.  She reports that the incision is healing well.    12/21/22:  MRI performed on lumbar spine:     OTHER: Tarlov cysts in the sacral region.       IMPRESSION   1.  Mild-to-moderate canal stenosis at L4-5, eccentric to the left, secondary to disc bulge with broad-based left paracentral protrusion. Narrowing of left lateral recess could affect proximal left L5 nerve root.   2.  Mild canal stenosis at L3-4 due to disc bulge with shallow central disc extrusion extending slightly above the disc level.   3.  Spondylosis at other lumbar levels, as outlined above, without significant canal stenosis.   4.  Mild scoliosis.      Assessment & Plan   59 y/o female with idiopathic urinary retention and urgency, urge incontinence as well as stress urinary incontinence and atrophic vaginitis. She is doing well with the new Axonics implant.  -estrogen cream  -f/u prn    Vijaya Padilla MD  [facility] [place]      ==========================

## 2023-06-28 NOTE — PROGRESS NOTES
Virtual Visit Details    Type of service:  Video Visit     Originating Location (pt. Location): Home    Distant Location (provider location):  Off-site  Platform used for Video Visit: Kimani

## 2023-06-28 NOTE — PROGRESS NOTES
Reason for visit:  F/u on SNS implant on 6/13/23    Clinical Data:  Ms. Lloyd is a 58 year old female with urinary urgency with minor urge incontinence and stress urinary incontinence.  She had UDS which showed an acontractile bladder.  This was done in [place].    She underwent above implant and reports that the device is working well.  She reports that the incision is healing well.    12/21/22:  MRI performed on lumbar spine:     OTHER: Tarlov cysts in the sacral region.       IMPRESSION   1.  Mild-to-moderate canal stenosis at L4-5, eccentric to the left, secondary to disc bulge with broad-based left paracentral protrusion. Narrowing of left lateral recess could affect proximal left L5 nerve root.   2.  Mild canal stenosis at L3-4 due to disc bulge with shallow central disc extrusion extending slightly above the disc level.   3.  Spondylosis at other lumbar levels, as outlined above, without significant canal stenosis.   4.  Mild scoliosis.      Assessment & Plan   59 y/o female with idiopathic urinary retention and urgency, urge incontinence as well as stress urinary incontinence and atrophic vaginitis. She is doing well with the new Axonics implant.  -estrogen cream  -f/u prn    Vijaya Padilla MD  [facility] UROLOGY CLINIC [place]      ==========================

## 2023-06-28 NOTE — NURSING NOTE
Is the patient currently in the state of [place]? YES    Visit mode:VIDEO    If the visit is dropped, the patient can be reconnected by: VIDEO VISIT: Text to cell phone: [phone]    Will anyone else be joining the visit? NO      How would you like to obtain your AVS? MyChart    Are changes needed to the allergy or medication list? NO    Reason for visit: Follow Up    Pt. [place] at airport. VF confirmed pt is within [facility]. VF unable to assess PHQ due to presence of unknown parties within vicinity of pt.     Jessica Sommer on 6/28/2023 at 10:36 AM

## 2023-06-29 ENCOUNTER — DOCUMENTATION ONLY (OUTPATIENT)
Dept: ORTHOPEDICS | Facility: CLINIC | Age: 58
End: 2023-06-29
Payer: COMMERCIAL

## 2023-06-29 NOTE — PROGRESS NOTES
Received Completed forms Yes   Faxed Forms Faxed To: ilda  Fax Number: [phone]   Sent to [facility] (Date) 6/27/23

## 2023-07-13 ENCOUNTER — TRANSFERRED RECORDS (OUTPATIENT)
Dept: HEALTH INFORMATION MANAGEMENT | Facility: CLINIC | Age: 58
End: 2023-07-13
Payer: COMMERCIAL

## 2023-08-08 ENCOUNTER — MYC MEDICAL ADVICE (OUTPATIENT)
Dept: UROLOGY | Facility: CLINIC | Age: 58
End: 2023-08-08

## 2023-08-10 PROBLEM — M54.50 BILATERAL LOW BACK PAIN WITHOUT SCIATICA: Status: RESOLVED | Noted: 2023-05-01 | Resolved: 2023-08-10

## 2023-08-14 ENCOUNTER — TRANSFERRED RECORDS (OUTPATIENT)
Dept: HEALTH INFORMATION MANAGEMENT | Facility: CLINIC | Age: 58
End: 2023-08-14
Payer: COMMERCIAL

## 2023-08-21 ENCOUNTER — TRANSFERRED RECORDS (OUTPATIENT)
Dept: HEALTH INFORMATION MANAGEMENT | Facility: CLINIC | Age: 58
End: 2023-08-21
Payer: COMMERCIAL

## 2023-08-30 ENCOUNTER — TRANSFERRED RECORDS (OUTPATIENT)
Dept: HEALTH INFORMATION MANAGEMENT | Facility: CLINIC | Age: 58
End: 2023-08-30
Payer: COMMERCIAL

## 2023-09-14 ENCOUNTER — TRANSFERRED RECORDS (OUTPATIENT)
Dept: HEALTH INFORMATION MANAGEMENT | Facility: CLINIC | Age: 58
End: 2023-09-14
Payer: COMMERCIAL

## 2023-09-21 ENCOUNTER — PRE VISIT (OUTPATIENT)
Dept: UROLOGY | Facility: CLINIC | Age: 58
End: 2023-09-21
Payer: COMMERCIAL

## 2023-09-21 ENCOUNTER — TRANSFERRED RECORDS (OUTPATIENT)
Dept: HEALTH INFORMATION MANAGEMENT | Facility: CLINIC | Age: 58
End: 2023-09-21
Payer: COMMERCIAL

## 2023-09-21 NOTE — TELEPHONE ENCOUNTER
Reason for Visit: Follow-up to discuss Continual pain Post-op    Diagnosis: Urinary urgency and urge incontinence     Rooming Requirements: Normal      Diane Rene MA  09/21/23  4:16 PM

## 2023-10-04 ENCOUNTER — VIRTUAL VISIT (OUTPATIENT)
Dept: UROLOGY | Facility: CLINIC | Age: 58
End: 2023-10-04
Payer: COMMERCIAL

## 2023-10-04 VITALS — WEIGHT: 145 LBS | BODY MASS INDEX: 22.71 KG/M2

## 2023-10-04 DIAGNOSIS — N39.3 STRESS INCONTINENCE: ICD-10-CM

## 2023-10-04 DIAGNOSIS — R33.9 URINARY RETENTION: ICD-10-CM

## 2023-10-04 DIAGNOSIS — N39.41 URGE INCONTINENCE: Primary | ICD-10-CM

## 2023-10-04 PROCEDURE — 99213 OFFICE O/P EST LOW 20 MIN: CPT | Mod: VID | Performed by: UROLOGY

## 2023-10-04 NOTE — PROGRESS NOTES
Reason for visit:  F/u on SNS implant on 6/13/23    Clinical Data:  Ms. Lloyd is a 58 year old female with urinary urgency with minor urge incontinence and stress urinary incontinence.  She had UDS which showed an acontractile bladder.  This was done in [place].    She underwent above implant and reports that the device is working well.  She reports that the incision is well healed but she has pain in the area especially if she sits on it or touches it.  She has tried Lidocaine patches, CBD oil and some other things which have not worked.  It feels like a left over bruise pain.    12/21/22:  MRI performed on lumbar spine:     OTHER: Tarlov cysts in the sacral region.       IMPRESSION   1.  Mild-to-moderate canal stenosis at L4-5, eccentric to the left, secondary to disc bulge with broad-based left paracentral protrusion. Narrowing of left lateral recess could affect proximal left L5 nerve root.   2.  Mild canal stenosis at L3-4 due to disc bulge with shallow central disc extrusion extending slightly above the disc level.   3.  Spondylosis at other lumbar levels, as outlined above, without significant canal stenosis.   4.  Mild scoliosis.      Assessment & Plan   57 y/o female with idiopathic urinary retention and urgency, urge incontinence as well as stress urinary incontinence and atrophic vaginitis. She is doing well with the new Axonics implant but the IPG site is uncomfortable.  She has tried Lidocaine patches, CBD oil and some other things which have not worked.  We discussed giving it a little more time, trying a rechargeable device which is smaller or try to revise the current battery.  She would like to give it a little more time for now and will try to come down on oct 23 from [email or web address] for an exam.  -continue estrogen cream  -tentative appointment on oct 23 in [place]    Vijaya Padilla MD  [facility] UROLOGY CLINIC [place]    20 minutes spent by me on the date of the encounter  doing chart review, history and exam, documentation and further activities per the note   ==========================

## 2023-10-04 NOTE — NURSING NOTE
Is the patient currently in the state of [place]? YES    Visit mode:VIDEO    If the visit is dropped, the patient can be reconnected by: VIDEO VISIT: Text to cell phone:   Telephone Information:   Mobile [phone]       Will anyone else be joining the visit? NO  (If patient encounters technical issues they should call [phone]    How would you like to obtain your AVS? MyChart    Are changes needed to the allergy or medication list? No, Pt stated no changes to allergies, and Pt stated no med changes    Reason for visit: ALTON VICTORIA

## 2023-10-04 NOTE — LETTER
10/4/2023       RE: Lara Lloyd  [address]  [place] [address]     Dear Colleague,    Thank you for referring your patient, Lara Lloyd, to the [facility] UROLOGY CLINIC [place] at [facility]. Please see a copy of my visit note below.    Virtual Visit Details    Type of service:  Video Visit     Originating Location (pt. Location): Home    Distant Location (provider location):  Off-site  Platform used for Video Visit: Kimani    Reason for visit:  F/u on SNS implant on 6/13/23    Clinical Data:  Ms. Lloyd is a 58 year old female with urinary urgency with minor urge incontinence and stress urinary incontinence.  She had UDS which showed an acontractile bladder.  This was done in [place].    She underwent above implant and reports that the device is working well.  She reports that the incision is well healed but she has pain in the area especially if she sits on it or touches it.  She has tried Lidocaine patches, CBD oil and some other things which have not worked.  It feels like a left over bruise pain.    12/21/22:  MRI performed on lumbar spine:     OTHER: Tarlov cysts in the sacral region.       IMPRESSION   1.  Mild-to-moderate canal stenosis at L4-5, eccentric to the left, secondary to disc bulge with broad-based left paracentral protrusion. Narrowing of left lateral recess could affect proximal left L5 nerve root.   2.  Mild canal stenosis at L3-4 due to disc bulge with shallow central disc extrusion extending slightly above the disc level.   3.  Spondylosis at other lumbar levels, as outlined above, without significant canal stenosis.   4.  Mild scoliosis.      Assessment & Plan  57 y/o female with idiopathic urinary retention and urgency, urge incontinence as well as stress urinary incontinence and atrophic vaginitis. She is doing well with the new Axonics implant but the IPG site is uncomfortable.  She has tried Lidocaine patches,  CBD oil and some other things which have not worked.  We discussed giving it a little more time, trying a rechargeable device which is smaller or try to revise the current battery.  She would like to give it a little more time for now and will try to come down on oct 23 from [employer] for an exam.  -continue estrogen cream  -tentative appointment on oct 23 in [place]    Vijaya Padilla MD  [facility] UROLOGY CLINIC [place]    20 minutes spent by me on the date of the encounter doing chart review, history and exam, documentation and further activities per the note   ==========================

## 2023-10-05 ENCOUNTER — TRANSFERRED RECORDS (OUTPATIENT)
Dept: HEALTH INFORMATION MANAGEMENT | Facility: CLINIC | Age: 58
End: 2023-10-05
Payer: COMMERCIAL

## 2023-10-23 ENCOUNTER — TRANSFERRED RECORDS (OUTPATIENT)
Dept: HEALTH INFORMATION MANAGEMENT | Facility: CLINIC | Age: 58
End: 2023-10-23

## 2023-10-23 ENCOUNTER — OFFICE VISIT (OUTPATIENT)
Dept: UROLOGY | Facility: CLINIC | Age: 58
End: 2023-10-23
Payer: COMMERCIAL

## 2023-10-23 DIAGNOSIS — R33.9 URINARY RETENTION: ICD-10-CM

## 2023-10-23 DIAGNOSIS — L76.82 INCISIONAL PAIN: Primary | ICD-10-CM

## 2023-10-23 PROCEDURE — 51798 US URINE CAPACITY MEASURE: CPT | Performed by: UROLOGY

## 2023-10-23 PROCEDURE — 99213 OFFICE O/P EST LOW 20 MIN: CPT | Mod: 25 | Performed by: UROLOGY

## 2023-10-23 RX ORDER — LIDOCAINE 50 MG/G
1 PATCH TOPICAL EVERY 24 HOURS
Qty: 30 PATCH | Refills: 4 | Status: SHIPPED | OUTPATIENT
Start: 2023-10-23

## 2023-10-23 NOTE — NURSING NOTE
Lara Lloyd's chief complaint for this visit includes:  Chief Complaint   Patient presents with    RECHECK     PVR urge incontinence     PCP: No Ref-Primary, Physician    post void residual 18mls    Referring Provider:  No referring provider defined for this encounter.    There were no vitals taken for this visit.  Data Unavailable        Allergies   Allergen Reactions    Formaldehyde Other (See Comments)     unknown    Sulfa Antibiotics Hives    Seasonal Allergies Other (See Comments)     Multiple reactions         Do you need any medication refills at today's visit? No    [facility] Assistant- Surgical Specialties

## 2023-10-23 NOTE — PROGRESS NOTES
Reason for visit:  F/u on SNS implant on 6/13/23    Clinical Data:  Ms. Lloyd is a 58 year old female with urinary urgency with minor urge incontinence and stress urinary incontinence.  She had UDS which showed an acontractile bladder.  This was done in [place].    She underwent above implant and reports that the device is working well.  She reports that the incision is well healed but she has pain in the area especially if she sits on it or touches it.  She has tried Lidocaine patches, CBD oil and some other things which have not worked.  It feels like a left over bruise pain.  This has been the same.    12/21/22:  MRI performed on lumbar spine:     OTHER: Tarlov cysts in the sacral region.       IMPRESSION   1.  Mild-to-moderate canal stenosis at L4-5, eccentric to the left, secondary to disc bulge with broad-based left paracentral protrusion. Narrowing of left lateral recess could affect proximal left L5 nerve root.   2.  Mild canal stenosis at L3-4 due to disc bulge with shallow central disc extrusion extending slightly above the disc level.   3.  Spondylosis at other lumbar levels, as outlined above, without significant canal stenosis.   4.  Mild scoliosis.      Assessment & Plan   57 y/o female with idiopathic urinary retention and urgency, urge incontinence as well as stress urinary incontinence and atrophic vaginitis. She is doing well with the new [employer] implant but the IPG site is uncomfortable.  She has tried Lidocaine patches, CBD oil and some other things which have not worked.  We again discussed revision but should like to wait for now.    discussed giving it a little more time, trying a rechargeable device which is smaller or try to revise the current battery.  She would like to give it a little more time for now and will try to come down on oct 23 from [place] for an exam.  -continue estrogen cream  -try lidocaine patches 5%  -f/u in the Spring      Vijaya Padilla MD  [facility]  [place] [facility] [place] [id number] minutes spent by me on the date of the encounter doing chart review, history and exam, documentation and further activities per the note   ==========================

## 2023-10-24 ENCOUNTER — TELEPHONE (OUTPATIENT)
Dept: UROLOGY | Facility: CLINIC | Age: 58
End: 2023-10-24
Payer: COMMERCIAL

## 2023-10-24 NOTE — TELEPHONE ENCOUNTER
Patient Contacted for the patient to call back and schedule the following:    Appointment type: Return  Provider: Dr. Padilla  Return date: Spring 2024  Specialty phone number: [phone]  Additonal Notes: Follow up in Spring 2024.    [facility]   Dermatology, Surgery, Urology  [facility]

## 2023-10-26 ENCOUNTER — TRANSFERRED RECORDS (OUTPATIENT)
Dept: HEALTH INFORMATION MANAGEMENT | Facility: CLINIC | Age: 58
End: 2023-10-26
Payer: COMMERCIAL

## 2023-11-08 ENCOUNTER — TRANSFERRED RECORDS (OUTPATIENT)
Dept: HEALTH INFORMATION MANAGEMENT | Facility: CLINIC | Age: 58
End: 2023-11-08
Payer: COMMERCIAL

## 2023-11-13 ENCOUNTER — TRANSFERRED RECORDS (OUTPATIENT)
Dept: HEALTH INFORMATION MANAGEMENT | Facility: CLINIC | Age: 58
End: 2023-11-13
Payer: COMMERCIAL

## 2023-11-17 ENCOUNTER — TRANSFERRED RECORDS (OUTPATIENT)
Dept: HEALTH INFORMATION MANAGEMENT | Facility: CLINIC | Age: 58
End: 2023-11-17
Payer: COMMERCIAL

## 2023-11-21 ENCOUNTER — TELEPHONE (OUTPATIENT)
Dept: UROLOGY | Facility: CLINIC | Age: 58
End: 2023-11-21
Payer: COMMERCIAL

## 2023-11-21 NOTE — TELEPHONE ENCOUNTER
Left Voicemail (2nd Attempt) for the patient to call back and schedule the following:    Appointment type: Return  Provider: Dr. Padilla  Return date: Spring 2024  Specialty phone number: [phone]  Additonal Notes: Follow up in the spring.     [facility]   Dermatology, Surgery, Urology  [facility]

## 2023-11-28 ENCOUNTER — TRANSFERRED RECORDS (OUTPATIENT)
Dept: HEALTH INFORMATION MANAGEMENT | Facility: CLINIC | Age: 58
End: 2023-11-28
Payer: COMMERCIAL

## 2023-12-14 ENCOUNTER — TRANSFERRED RECORDS (OUTPATIENT)
Dept: HEALTH INFORMATION MANAGEMENT | Facility: CLINIC | Age: 58
End: 2023-12-14
Payer: COMMERCIAL

## 2024-01-09 ENCOUNTER — TRANSFERRED RECORDS (OUTPATIENT)
Dept: HEALTH INFORMATION MANAGEMENT | Facility: CLINIC | Age: 59
End: 2024-01-09
Payer: COMMERCIAL

## 2024-02-13 ENCOUNTER — MYC MEDICAL ADVICE (OUTPATIENT)
Dept: UROLOGY | Facility: CLINIC | Age: 59
End: 2024-02-13
Payer: COMMERCIAL

## 2024-02-13 DIAGNOSIS — R39.89 SUSPECTED UTI: Primary | ICD-10-CM

## 2024-02-13 NOTE — TELEPHONE ENCOUNTER
Email sent to [employer] rep to contact patient. Routed message to Triage nurse for symptoms.     Cindy Hendrix LPN on 2/13/2024 at 10:50 AM

## 2024-02-16 NOTE — CONFIDENTIAL NOTE
Per chart review, UA/UC not yet completed. Will continue to watch for results.     Kavitha Pulido RN, BSN

## 2024-02-22 NOTE — TELEPHONE ENCOUNTER
Left voicemail for patient to call back - if patient calls back need to inform her of urine culture results and to continue Keflex and also ask if an [employer] rep has reached out to her.     Sharon Salmeron MA on 2/22/2024 at 9:37 AM

## 2024-02-27 NOTE — TELEPHONE ENCOUNTER
Spoke with patient and she stated that she went into a local clinic near her and was treated for the UTI - is no longer having symptoms.     She also stated that she has been in contact with Jalil from [employer] in regards to her missing remote and that has been taken care of as well.     Closing encounter  Sharon Salmeron MA on 2/27/2024 at 12:14 PM

## 2024-03-10 ENCOUNTER — HEALTH MAINTENANCE LETTER (OUTPATIENT)
Age: 59
End: 2024-03-10

## 2024-05-15 ENCOUNTER — TRANSFERRED RECORDS (OUTPATIENT)
Dept: HEALTH INFORMATION MANAGEMENT | Facility: CLINIC | Age: 59
End: 2024-05-15
Payer: COMMERCIAL

## 2024-06-22 ENCOUNTER — MYC MEDICAL ADVICE (OUTPATIENT)
Dept: ALLERGY | Facility: CLINIC | Age: 59
End: 2024-06-22
Payer: COMMERCIAL

## 2024-06-26 ENCOUNTER — TRANSFERRED RECORDS (OUTPATIENT)
Dept: HEALTH INFORMATION MANAGEMENT | Facility: CLINIC | Age: 59
End: 2024-06-26
Payer: COMMERCIAL

## 2024-06-27 ENCOUNTER — OFFICE VISIT (OUTPATIENT)
Dept: ALLERGY | Facility: CLINIC | Age: 59
End: 2024-06-27
Payer: COMMERCIAL

## 2024-06-27 ENCOUNTER — LAB (OUTPATIENT)
Dept: LAB | Facility: CLINIC | Age: 59
End: 2024-06-27
Payer: COMMERCIAL

## 2024-06-27 VITALS
HEART RATE: 81 BPM | DIASTOLIC BLOOD PRESSURE: 79 MMHG | BODY MASS INDEX: 26.53 KG/M2 | SYSTOLIC BLOOD PRESSURE: 135 MMHG | WEIGHT: 169.4 LBS | OXYGEN SATURATION: 95 %

## 2024-06-27 DIAGNOSIS — T36.1X5A: ICD-10-CM

## 2024-06-27 DIAGNOSIS — T36.0X5A: ICD-10-CM

## 2024-06-27 DIAGNOSIS — J30.1 SEASONAL ALLERGIC RHINITIS DUE TO POLLEN: Primary | ICD-10-CM
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
## 2024-06-27 DIAGNOSIS — J30.1 SEASONAL ALLERGIC RHINITIS DUE TO POLLEN: ICD-10-CM

## 2024-06-27 PROCEDURE — 95004 PERQ TESTS W/ALRGNC XTRCS: CPT | Performed by: INTERNAL MEDICINE

## 2024-06-27 PROCEDURE — 99204 OFFICE O/P NEW MOD 45 MIN: CPT | Mod: 25 | Performed by: INTERNAL MEDICINE

## 2024-06-27 PROCEDURE — 86003 ALLG SPEC IGE CRUDE XTRC EA: CPT

## 2024-06-27 PROCEDURE — 36415 COLL VENOUS BLD VENIPUNCTURE: CPT

## 2024-06-27 RX ORDER — HYDROCODONE BITARTRATE AND ACETAMINOPHEN 5; 325 MG/1; MG/1
TABLET ORAL
COMMUNITY

## 2024-06-27 RX ORDER — TRIAMCINOLONE ACETONIDE 1 MG/G
CREAM TOPICAL 2 TIMES DAILY PRN
Qty: 30 G | Refills: 1 | Status: SHIPPED | OUTPATIENT
Start: 2024-06-27

## 2024-06-27 RX ORDER — LORATADINE, PSEUDOEPHEDRINE SULFATE 5; 120 MG/1; MG/1
TABLET, FILM COATED, EXTENDED RELEASE ORAL
COMMUNITY

## 2024-06-27 NOTE — PROGRESS NOTES
Lara Lloyd was seen in the Allergy Clinic at [facility].    Lara Lloyd is a 59 year old female being seen today in consultation for seasonal allergies.    She drove 6 hours from [place] [employer] [place] for evaluation.  She had testing over 20 years ago that was positive to multiple environmental allergens.  She describes her symptoms of sneezing, eye itching as well as sinus pressure.  She does take Claritin-D since congestion is one of her main symptoms and the Claritin-D does provide some benefit.      When she is around mold she will develop lip swelling.      She is has concerns about formaldehyde causing a headache and facial pressure.  She cannot go into [employer] based on reactions in [place].      She can take amoxicillin but she has had reactions to Augmentin on 2 different occasions.  She is describing clavulanic acid as a potential factor causing hives and acute reactions.  1 occasion she feels like an EpiPen was necessary but did not have one available.    When she gets mosquito bite she will have large local reactions and has tried Benadryl cream without much benefit.      Past Medical History:   Diagnosis Date    Chronic back pain     Urinary retention     Urinary urgency      Family History   Problem Relation Age of Onset    Myocardial Infarction Mother     Hyperlipidemia Father     Anesthesia Reaction No family hx of     Thrombosis No family hx of      Past Surgical History:   Procedure Laterality Date    COLONOSCOPY      HYSTERECTOMY      IMPLANT STIMULATOR SACRAL NERVE PERCUTANEOUS TRIAL N/A 04/25/2023    Procedure: INSERTION, NEUROSTIMULATOR, SACRAL, PERCUTANEOUS, FOR TRIAL (trial for axonics);  Surgeon: Vijaya Padilla MD;  Location: [facility] OR    IMPLANT STIMULATOR SACRAL NERVE STAGE ONE Left 6/13/2023    Procedure: INSERTION, SACRAL NERVE STIMULATOR, STAGE 1 AND 2 (Implant permanent lead and non-rechargeable generator for [employer] on the Left);  Surgeon: Valerie  Vijaya GALVAN MD;  Location: [facility] OR       ENVIRONMENTAL HISTORY:   Pets inside the house include 1 cat(s).  Do you smoke cigarettes or other recreational drugs? No There is/are 0 smokers living in the house. The house does not have a basement. 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      Review of Systems      Current Outpatient Medications:     HYDROcodone-acetaminophen (NORCO) 5-325 MG tablet, , Disp: , Rfl:     SM LORATADINE D 12HR 5-120 MG 12 hr tablet, TAKE 1 TABLET BY MOUTH EVERY 12 HOURS FOR 90 DAYS, Disp: , Rfl:     triamcinolone (KENALOG) 0.1 % external cream, Apply topically 2 times daily as needed for irritation Use on mosquito bites, Disp: 30 g, Rfl: 1    lidocaine (LIDODERM) 5 % patch, Place 1 patch onto the skin every 24 hours To prevent lidocaine toxicity, patient should be patch free for 12 hrs daily. (Patient not taking: Reported on 6/27/2024), Disp: 30 patch, Rfl: 4  Allergies   Allergen Reactions    Formaldehyde Other (See Comments)     unknown    Sulfa Antibiotics Hives    Seasonal Allergies Other (See Comments)     Multiple reactions         EXAM:   /79   Pulse 81   Wt 76.8 kg (169 lb 6.4 oz)   SpO2 95%   BMI 26.53 kg/m      Physical Exam    Constitutional:       General: She is not in acute distress.     Appearance: Normal appearance. She is not ill-appearing.   HENT:      Head: Normocephalic and atraumatic.      Nose: Nose normal. No congestion or rhinorrhea.      Mouth/Throat:      Mouth: Mucous membranes are moist.      Pharynx: Oropharynx is clear. No posterior oropharyngeal erythema.   Eyes:      General:         Right eye: No discharge.         Left eye: No discharge.   Cardiovascular:      Rate and Rhythm: Normal rate and regular rhythm.      Heart sounds: Normal heart sounds.   Pulmonary:      Effort: Pulmonary effort is normal.      Breath sounds: Normal breath sounds. No wheezing or rhonchi.    Skin:     General: Skin is warm.      Findings: No erythema or rash.   Neurological:      General: No focal deficit present.      Mental Status: She is alert. Mental status is at baseline.   Psychiatric:         Mood and Affect: Mood normal.         Behavior: Behavior normal.      WORKUP: Skin testing was positive to a tree and weed    ENVIRONMENTAL PERCUTANEOUS SKIN TESTING: ADULT      6/27/2024    11:00 AM   Odd Environmental   Consent Y   Ordering Physician Dr. Blum   Interpreting Physician Dr. Blum   Testing Technician Jenni JIM RN   Location Back   Time start: 11:15   Time End: 11:30   Positive Control: Histatrol*ALK 1 mg/ml 3/5   Negative Control: 50% Glycerin 0   Cat Hair*ALK (10,000 BAU/ml) 0   AP Dog Hair/Dander (1:100 w/v) 0   Dust Mite p. 30,000 AU/ml 0   Dust Mite f. (30,000 AU/ml) 0   Merlin (W/F in millimeters) 0   Enoch Grass (100,000 BAU/mL) 0   Red Cedar (W/F in millimeters) 0   Maple/[place] (W/F in millimeters) 0   Hackberry (W/F in millimeters) 0   [place] (W/F in millimeters) 0   [place] *ALK (W/F in millimeters) 0   American Elm (W/F in millimeters) 0   [place] (W/F in millimeters) 0   Black [place] (W/F in millimeters) 3/5   Birch Mix (W/F in millimeters) 0   Custer (W/F in millimeters) 0   Oak (W/F in millimeters) 0   Cocklebur (W/F in millimeters) 0   [place] (W/F in millimeters) 0   White Darrell (W/F in millimeters) 0   Careless (W/F in millimeters) 0   Nettle (W/F in millimeters) 0   English Plantain (W/F in millimeters) 0   Kochia (W/F in millimeters) 0   Lamb's Quarter (W/F in millimeters) 0   Marshelder (W/F in millimeters) 0   Ragweed Mix* ALK (W/F in millimeters) 4/5   Russian Thistle (W/F in millimeters) 0   Sagebrush/Mugwort (W/F in millimeters) 0   Sheep Sorrel (W/F in millimeters) 0   Feather Mix* ALK (W/F in millimeters) 0   Penicillium Mix (1:10 w/v) 0   Curvularia spicifera (1:10 w/v) 0   Epicoccum (1:10 w/v) 0   Aspergillus fumigatus (1:10 w/v): 0   Alternaria  tenius (1:10 w/v) 0   H. Cladosporium (1:10 w/v) 0   Phoma herbarum (1:10 w/v) 0         ASSESSMENT/PLAN:  Lara Lloyd is a 59 year old female seen today for evaluation of allergic rhinitis.  She had questions about mold which tested negative on the panel.  We are unable to test to formaldehyde and she can continue to limit exposure.  She can take amoxicillin but she has had reactions to Augmentin which suggest clavulanic acid as a potential allergen.  I do not have testing available for clavulanic acid.  I did speak with Dr. Hutchinson who could potentially test amoxicillin and Augmentin.  We discussed that with her today and at this point she will continue to avoid Augmentin but will take amoxicillin in the future as she is tolerated that since she has had reactions to the Augmentin.    For mosquito bites did order triamcinolone to use as needed.    Will check labs for additional environmental allergens due to your previous history and symptoms that you are describing.  In addition to Claritin-D, may consider trying Allegra 180 mg.  The Pseudophed may let raise blood pressure.  In addition you can try Flonase 1 to 2 sprays each nostril daily.  For itchy eyes may use Pataday or Zaditor eyedrops.  Until we get more information, I would defer allergy shots.  Allergy shots do take significant time and living in [place] would make this quite problematic.  The shots are given every 3 to 14 days for 25 visits followed by monthly for several years.  Currently there is not shots offered that are every 3 months.  If you want to evaluate the clavulanic acid component of the Augmentin, Dr. Hutchinson could do additional testing.  However at this time based on her history I recommend avoidance and stick with amoxicillin only.  I did prescribe triamcinolone to use for your bug bites.    Follow-up as needed.      Thank you for allowing me to participate in the care of Lara Lloyd.      I spent 40 minutes on the  date of the encounter doing chart review, history and exam, documentation and further coordination as noted above exclusive of separately reported interpretations    Vitaly Blum MD  Allergy/Immunology  [facility]

## 2024-06-27 NOTE — LETTER
6/27/2024      Lara Lloyd  [address]  [place] [address]      Dear Colleague,    Thank you for referring your patient, Lara Lloyd, to the [facility] [place]. Please see a copy of my visit note below.    Lara Lloyd was seen in the Allergy Clinic at [facility].    Lara Lloyd is a 59 year old female being seen today in consultation for seasonal allergies.    She drove 6 hours from [place] for evaluation.  She had testing over 20 years ago that was positive to multiple environmental allergens.  She describes her symptoms of sneezing, eye itching as well as sinus pressure.  She does take Claritin-D since congestion is one of her main symptoms and the Claritin-D does provide some benefit.      When she is around mold she will develop lip swelling.      She is has concerns about formaldehyde causing a headache and facial pressure.  She cannot go into [employer] based on reactions in [employer].      She can take amoxicillin but she has had reactions to Augmentin on 2 different occasions.  She is describing clavulanic acid as a potential factor causing hives and acute reactions.  1 occasion she feels like an EpiPen was necessary but did not have one available.    When she gets mosquito bite she will have large local reactions and has tried Benadryl cream without much benefit.      Past Medical History:   Diagnosis Date     Chronic back pain      Urinary retention      Urinary urgency      Family History   Problem Relation Age of Onset     Myocardial Infarction Mother      Hyperlipidemia Father      Anesthesia Reaction No family hx of      Thrombosis No family hx of      Past Surgical History:   Procedure Laterality Date     COLONOSCOPY       HYSTERECTOMY       IMPLANT STIMULATOR SACRAL NERVE PERCUTANEOUS TRIAL N/A 04/25/2023    Procedure: INSERTION, NEUROSTIMULATOR, SACRAL, PERCUTANEOUS, FOR TRIAL (trial for [email or web address]);  Surgeon: Valerie  Vijaya GALVAN MD;  Location: [facility] OR     IMPLANT STIMULATOR SACRAL NERVE STAGE ONE Left 6/13/2023    Procedure: INSERTION, SACRAL NERVE STIMULATOR, STAGE 1 AND 2 (Implant permanent lead and non-rechargeable generator for [employer] on the Left);  Surgeon: Vijaya Padilla MD;  Location: [facility] OR       ENVIRONMENTAL HISTORY:   Pets inside the house include 1 cat(s).  Do you smoke cigarettes or other recreational drugs? No There is/are 0 smokers living in the house. The house does not have a basement. 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      Review of Systems      Current Outpatient Medications:      HYDROcodone-acetaminophen (NORCO) 5-325 MG tablet, , Disp: , Rfl:      SM LORATADINE D 12HR 5-120 MG 12 hr tablet, TAKE 1 TABLET BY MOUTH EVERY 12 HOURS FOR 90 DAYS, Disp: , Rfl:      triamcinolone (KENALOG) 0.1 % external cream, Apply topically 2 times daily as needed for irritation Use on mosquito bites, Disp: 30 g, Rfl: 1     lidocaine (LIDODERM) 5 % patch, Place 1 patch onto the skin every 24 hours To prevent lidocaine toxicity, patient should be patch free for 12 hrs daily. (Patient not taking: Reported on 6/27/2024), Disp: 30 patch, Rfl: 4  Allergies   Allergen Reactions     Formaldehyde Other (See Comments)     unknown     Sulfa Antibiotics Hives     Seasonal Allergies Other (See Comments)     Multiple reactions         EXAM:   /79   Pulse 81   Wt 76.8 kg (169 lb 6.4 oz)   SpO2 95%   BMI 26.53 kg/m      Physical Exam    Constitutional:       General: She is not in acute distress.     Appearance: Normal appearance. She is not ill-appearing.   HENT:      Head: Normocephalic and atraumatic.      Nose: Nose normal. No congestion or rhinorrhea.      Mouth/Throat:      Mouth: Mucous membranes are moist.      Pharynx: Oropharynx is clear. No posterior oropharyngeal erythema.   Eyes:      General:         Right eye: No discharge.          Left eye: No discharge.   Cardiovascular:      Rate and Rhythm: Normal rate and regular rhythm.      Heart sounds: Normal heart sounds.   Pulmonary:      Effort: Pulmonary effort is normal.      Breath sounds: Normal breath sounds. No wheezing or rhonchi.   Skin:     General: Skin is warm.      Findings: No erythema or rash.   Neurological:      General: No focal deficit present.      Mental Status: She is alert. Mental status is at baseline.   Psychiatric:         Mood and Affect: Mood normal.         Behavior: Behavior normal.      WORKUP: Skin testing was positive to a tree and weed    ENVIRONMENTAL PERCUTANEOUS SKIN TESTING: ADULT      6/27/2024    11:00 AM   [facility] Environmental   Consent Y   Ordering Physician Dr. Blum   Interpreting Physician Dr. Blum   Testing Technician Jenni JIM, RN   Location Back   Time start: 11:15   Time End: 11:30   Positive Control: Histatrol*ALK 1 mg/ml 3/5   Negative Control: 50% Glycerin 0   Cat Hair*ALK (10,000 BAU/ml) 0   AP Dog Hair/Dander (1:100 w/v) 0   Dust Mite p. 30,000 AU/ml 0   Dust Mite f. (30,000 AU/ml) 0   Merlin (W/F in millimeters) 0   Enoch Grass (100,000 BAU/mL) 0   Red Cedar (W/F in millimeters) 0   Maple/[place] (W/F in millimeters) 0   Hackberry (W/F in millimeters) 0   [place] (W/F in millimeters) 0   [place] *ALK (W/F in millimeters) 0   American Elm (W/F in millimeters) 0   [place] (W/F in millimeters) 0   Black [place] (W/F in millimeters) 3/5   Birch Mix (W/F in millimeters) 0   [place] (W/F in millimeters) 0   Oak (W/F in millimeters) 0   Cocklebur (W/F in millimeters) 0   [place] (W/F in millimeters) 0   White Darrell (W/F in millimeters) 0   Careless (W/F in millimeters) 0   Nettle (W/F in millimeters) 0   English Plantain (W/F in millimeters) 0   Kochia (W/F in millimeters) 0   Lamb's Quarter (W/F in millimeters) 0   Marshelder (W/F in millimeters) 0   Ragweed Mix* ALK (W/F in millimeters) 4/5   Russian Thistle (W/F in millimeters) 0    Sagebrush/Mugwort (W/F in millimeters) 0   Sheep Sorrel (W/F in millimeters) 0   Feather Mix* ALK (W/F in millimeters) 0   Penicillium Mix (1:10 w/v) 0   Curvularia spicifera (1:10 w/v) 0   Epicoccum (1:10 w/v) 0   Aspergillus fumigatus (1:10 w/v): 0   Alternaria tenius (1:10 w/v) 0   H. Cladosporium (1:10 w/v) 0   Phoma herbarum (1:10 w/v) 0         ASSESSMENT/PLAN:  Lara Lloyd is a 59 year old female seen today for evaluation of allergic rhinitis.  She had questions about mold which tested negative on the panel.  We are unable to test to formaldehyde and she can continue to limit exposure.  She can take amoxicillin but she has had reactions to Augmentin which suggest clavulanic acid as a potential allergen.  I do not have testing available for clavulanic acid.  I did speak with Dr. Hutchinson who could potentially test amoxicillin and Augmentin.  We discussed that with her today and at this point she will continue to avoid Augmentin but will take amoxicillin in the future as she is tolerated that since she has had reactions to the Augmentin.    For mosquito bites did order triamcinolone to use as needed.    Will check labs for additional environmental allergens due to your previous history and symptoms that you are describing.  In addition to Claritin-D, may consider trying Allegra 180 mg.  The Pseudophed may let raise blood pressure.  In addition you can try Flonase 1 to 2 sprays each nostril daily.  For itchy eyes may use Pataday or Zaditor eyedrops.  Until we get more information, I would defer allergy shots.  Allergy shots do take significant time and [place] this quite problematic.  The shots are given every 3 to 14 days for 25 visits followed by monthly for several years.  Currently there is not shots offered that are every 3 months.  If you want to evaluate the clavulanic acid component of the Augmentin, Dr. Hutchinson could do additional testing.  However at this time  based on her history I recommend avoidance and stick with amoxicillin only.  I did prescribe triamcinolone to use for your bug bites.    Follow-up as needed.      Thank you for allowing me to participate in the care of Lara Lloyd.      I spent 40 minutes on the date of the encounter doing chart review, history and exam, documentation and further coordination as noted above exclusive of separately reported interpretations    Vitaly Blum MD  Allergy/Immunology  [facility]      Per provider verbal order, placed Adult Environmental Panel scratch test.  Verbal consent was obtained by MD prior to procedure.  Once panels were placed, patient was monitored for 15 minutes in clinic.  Provider read test after 15 minutes.  Pt tolerated procedure well.  All questions and concerns were addressed at office visit.     CHANCE BrowneN, RN       Again, thank you for allowing me to participate in the care of your patient.        Sincerely,        Vitaly Blum MD

## 2024-06-27 NOTE — PATIENT INSTRUCTIONS
Will check labs for additional environmental allergens due to your previous history and symptoms that you are describing.  In addition to Claritin-D, may consider trying Allegra 180 mg.  The Pseudophed may let raise blood pressure.  In addition you can try Flonase 1 to 2 sprays each nostril daily.  For itchy eyes may use Pataday or Zaditor eyedrops.  Until we get more information, I would defer allergy shots.  Allergy shots do take significant time and living [place] would make this quite problematic.  The shots are given every 3 to 14 days for 25 visits followed by monthly for several years.  Currently there is not shots offered that are every 3 months.  If you want to evaluate the clavulanic acid component of the Augmentin, Dr. Hutchinson could do additional testing.  However at this time based on her history I recommend avoidance and stick with amoxicillin only.  I did prescribe triamcinolone to use for your bug bites.          Allergy Staff Appt Hours Shot Hours Location       Physician   Vitaly Blum MD      Support Staff   LING Ibrahim RN, MA Emily J., MA      Mondays Tuesdays Thursdays and Fridays:      [place]ri[place] 7-5 Wednesdays         Close                Mondays, Tuesdays and Fridays:  7:20 - 3:40              [facility]  [address]  [place] [address]  Allergy appointment  line: [phone]    Pulmonary Function Scheduling:  [place]: [phone]

## 2024-06-27 NOTE — PROGRESS NOTES
Per provider verbal order, placed Adult Environmental Panel scratch test.  Verbal consent was obtained by MD prior to procedure.  Once panels were placed, patient was monitored for 15 minutes in clinic.  Provider read test after 15 minutes.  Pt tolerated procedure well.  All questions and concerns were addressed at office visit.     CHANCE BrowneN, RN

## 2024-06-30 LAB
FIREBUSH IGE QN: <0.1 KU(A)/L
GOOSEFOOT IGE QN: <0.1 KU(A)/L
MUGWORT IGE QN: <0.1 KU(A)/L
SALTWORT IGE QN: <0.1 KU(A)/L
SILVER BIRCH IGE QN: <0.1 KU(A)/L
TIMOTHY IGE QN: <0.1 KU(A)/L
WHITE OAK IGE QN: <0.1 KU(A)/L
WORMWOOD IGE QN: <0.1 KU(A)/L

## 2024-07-01 LAB
A ALTERNATA IGE QN: <0.1 KU(A)/L
A FUMIGATUS IGE QN: <0.1 KU(A)/L
CAT DANDER IGG QN: <0.1 KU(A)/L
CEDAR IGE QN: <0.1 KU(A)/L
D FARINAE IGE QN: <0.1 KU(A)/L
D PTERONYSS IGE QN: <0.1 KU(A)/L
WHITE ASH IGE QN: <0.1 KU(A)/L
WHITE ELM IGE QN: <0.1 KU(A)/L

## 2024-08-13 ENCOUNTER — TRANSFERRED RECORDS (OUTPATIENT)
Dept: HEALTH INFORMATION MANAGEMENT | Facility: CLINIC | Age: 59
End: 2024-08-13
Payer: COMMERCIAL

## 2024-11-06 ENCOUNTER — TRANSFERRED RECORDS (OUTPATIENT)
Dept: HEALTH INFORMATION MANAGEMENT | Facility: CLINIC | Age: 59
End: 2024-11-06
Payer: COMMERCIAL

## 2025-02-16 ENCOUNTER — HEALTH MAINTENANCE LETTER (OUTPATIENT)
Age: 60
End: 2025-02-16

## 2025-03-16 ENCOUNTER — HEALTH MAINTENANCE LETTER (OUTPATIENT)
Age: 60
End: 2025-03-16

## (undated) DEVICE — KIT NRSTM AXONICS LEAD NON MEDICATED 1801

## (undated) DEVICE — ESU GROUND PAD ADULT W/CORD E7507

## (undated) DEVICE — SU VICRYL 3-0 SH 27" UND J416H

## (undated) DEVICE — TRIAL STIMULATOR

## (undated) DEVICE — STRAP KNEE/BODY 31143004

## (undated) DEVICE — GLOVE RADIATION RESISTANT SZ 7.5  95-395

## (undated) DEVICE — GLOVE BIOGEL PI MICRO SZ 7.0 48570

## (undated) DEVICE — ADH SKIN CLOSURE PREMIERPRO EXOFIN 1.0ML 3470

## (undated) DEVICE — DRSG PRIMAPORE 02X3" 7133

## (undated) DEVICE — SU STRATAFIX MONOCRYL 3-0 SPIRAL PS-2 45CM SXMP1B107

## (undated) DEVICE — SUCTION MANIFOLD NEPTUNE 2 SYS 1 PORT 702-025-000

## (undated) DEVICE — PNE LEAD IMPLANT KIT

## (undated) DEVICE — DRSG GAUZE 4X4" TRAY 6939

## (undated) DEVICE — SUCTION TIP YANKAUER W/O VENT K86

## (undated) DEVICE — SOL WATER IRRIG 500ML BOTTLE 2F7113

## (undated) DEVICE — DRAPE IOBAN INCISE 23X17" 6650EZ

## (undated) DEVICE — PACK MINOR CUSTOM ASC

## (undated) DEVICE — DRAPE LAP TRANSVERSE 29421

## (undated) DEVICE — PROGRAMMER NRSTM AXONICS REMOTE WIRELESS 2301

## (undated) DEVICE — SYR BULB IRRIG DOVER 60 ML LATEX FREE 67000

## (undated) DEVICE — BASIN EMESIS STERILE  SSK9005A

## (undated) DEVICE — DRAPE BACK TABLE  44X90" 8377

## (undated) DEVICE — GOWN LG DISP 9515

## (undated) DEVICE — SOL BENZOIN 0.5OZ

## (undated) DEVICE — PREP CHLORAPREP W/ORANGE TINT 10.5ML 260715

## (undated) DEVICE — SU VICRYL 3-0 RB-1 27" UND J215H

## (undated) DEVICE — DRSG STERI STRIP 1/2X4" R1547

## (undated) DEVICE — NDL 27GA 1.25" 305136

## (undated) DEVICE — Device

## (undated) DEVICE — PREP CHLORAPREP 26ML TINTED ORANGE  260815

## (undated) DEVICE — PEN MARKING SKIN W/LABELS 31145884

## (undated) DEVICE — LINEN TOWEL PACK X5 5464

## (undated) DEVICE — SYR 10ML FINGER CONTROL W/O NDL 309695

## (undated) RX ORDER — KETOROLAC TROMETHAMINE 30 MG/ML
INJECTION, SOLUTION INTRAMUSCULAR; INTRAVENOUS
Status: DISPENSED
Start: 2023-06-13

## (undated) RX ORDER — BUPIVACAINE HYDROCHLORIDE 5 MG/ML
INJECTION, SOLUTION EPIDURAL; INTRACAUDAL
Status: DISPENSED
Start: 2023-06-13

## (undated) RX ORDER — CEFAZOLIN SODIUM 1 G/3ML
INJECTION, POWDER, FOR SOLUTION INTRAMUSCULAR; INTRAVENOUS
Status: DISPENSED
Start: 2023-06-13

## (undated) RX ORDER — FENTANYL CITRATE 50 UG/ML
INJECTION, SOLUTION INTRAMUSCULAR; INTRAVENOUS
Status: DISPENSED
Start: 2023-06-13

## (undated) RX ORDER — OXYCODONE HYDROCHLORIDE 5 MG/1
TABLET ORAL
Status: DISPENSED
Start: 2023-06-13

## (undated) RX ORDER — ACETAMINOPHEN 325 MG/1
TABLET ORAL
Status: DISPENSED
Start: 2023-06-13

## (undated) RX ORDER — HYDROMORPHONE HYDROCHLORIDE 1 MG/ML
INJECTION, SOLUTION INTRAMUSCULAR; INTRAVENOUS; SUBCUTANEOUS
Status: DISPENSED
Start: 2023-06-13

## (undated) RX ORDER — GLYCOPYRROLATE 0.2 MG/ML
INJECTION INTRAMUSCULAR; INTRAVENOUS
Status: DISPENSED
Start: 2023-06-13

## (undated) RX ORDER — PROPOFOL 10 MG/ML
INJECTION, EMULSION INTRAVENOUS
Status: DISPENSED
Start: 2023-06-13

## (undated) RX ORDER — BUPIVACAINE HYDROCHLORIDE 2.5 MG/ML
INJECTION, SOLUTION EPIDURAL; INFILTRATION; INTRACAUDAL
Status: DISPENSED
Start: 2023-04-25

## (undated) RX ORDER — CEFAZOLIN SODIUM 2 G/50ML
SOLUTION INTRAVENOUS
Status: DISPENSED
Start: 2023-06-13

## (undated) RX ORDER — BUPIVACAINE HYDROCHLORIDE 2.5 MG/ML
INJECTION, SOLUTION EPIDURAL; INFILTRATION; INTRACAUDAL
Status: DISPENSED
Start: 2023-06-13

## (undated) RX ORDER — ONDANSETRON 2 MG/ML
INJECTION INTRAMUSCULAR; INTRAVENOUS
Status: DISPENSED
Start: 2023-06-13